# Patient Record
Sex: MALE | Employment: FULL TIME | URBAN - METROPOLITAN AREA
[De-identification: names, ages, dates, MRNs, and addresses within clinical notes are randomized per-mention and may not be internally consistent; named-entity substitution may affect disease eponyms.]

---

## 2017-02-10 ENCOUNTER — INFUSION THERAPY VISIT (OUTPATIENT)
Dept: INFUSION THERAPY | Facility: CLINIC | Age: 37
End: 2017-02-10
Payer: COMMERCIAL

## 2017-02-10 DIAGNOSIS — G35 MULTIPLE SCLEROSIS (H): Primary | ICD-10-CM

## 2017-02-10 PROCEDURE — 99207 ZZC NO CHARGE LOS: CPT

## 2017-02-10 PROCEDURE — 96413 CHEMO IV INFUSION 1 HR: CPT | Performed by: INTERNAL MEDICINE

## 2017-02-10 RX ADMIN — Medication 250 ML: at 08:55

## 2017-02-10 NOTE — PROGRESS NOTES
Infusion Nursing Note:  Freddie Garcia presents today for tysabri.    Patient seen by provider today: No   present during visit today: Not Applicable.    Note: N/A.    Intravenous Access:  Peripheral IV placed.    Treatment Conditions:  Tysabri pre-infusion checklist completed via touch program.      Post Infusion Assessment:  Patient tolerated infusion without incident.  No evidence of extravasations.  Access discontinued per protocol.    Discharge Plan:    Patient will return 3/24/17 for next appointment.   Patient discharged in stable condition accompanied by: self.  Departure Mode: Ambulatory.    Patricia Villagomez RN

## 2017-03-24 ENCOUNTER — INFUSION THERAPY VISIT (OUTPATIENT)
Dept: INFUSION THERAPY | Facility: CLINIC | Age: 37
End: 2017-03-24
Payer: COMMERCIAL

## 2017-03-24 VITALS
OXYGEN SATURATION: 97 % | SYSTOLIC BLOOD PRESSURE: 133 MMHG | DIASTOLIC BLOOD PRESSURE: 75 MMHG | BODY MASS INDEX: 26.96 KG/M2 | HEART RATE: 69 BPM | TEMPERATURE: 97.8 F | RESPIRATION RATE: 16 BRPM | WEIGHT: 198.8 LBS

## 2017-03-24 DIAGNOSIS — G35 MULTIPLE SCLEROSIS (H): Primary | ICD-10-CM

## 2017-03-24 PROCEDURE — 96413 CHEMO IV INFUSION 1 HR: CPT | Performed by: INTERNAL MEDICINE

## 2017-03-24 PROCEDURE — 99207 ZZC NO CHARGE LOS: CPT

## 2017-03-24 RX ORDER — DIPHENHYDRAMINE HYDROCHLORIDE 50 MG/ML
25 INJECTION INTRAMUSCULAR; INTRAVENOUS
Status: CANCELLED
Start: 2017-03-24

## 2017-03-24 RX ADMIN — Medication 250 ML: at 08:53

## 2017-03-24 ASSESSMENT — PAIN SCALES - GENERAL: PAINLEVEL: NO PAIN (0)

## 2017-03-24 NOTE — MR AVS SNAPSHOT
After Visit Summary   3/24/2017    Freddie Garcia    MRN: [id number]           Patient Information     Date Of Birth          1980        Visit Information        Provider Department      3/24/2017 8:30 AM BAY 2 INFUSION [facility]        Today's Diagnoses     Multiple sclerosis (H)    -  1       Follow-ups after your visit        Your next 10 appointments already scheduled     May 05, 2017  8:00 AM CDT   Level 2 with BAY 10 INFUSION   [facility]    [address] [place] [address] [phone]              Who to contact     If you have questions or need follow up information about today's clinic visit or your schedule please contact [facility] directly at [phone].  Normal or non-critical lab and imaging results will be communicated to you by MyChart, letter or phone within 4 business days after the clinic has received the results. If you do not hear from us within 7 days, please contact the clinic through MyChart or phone. If you have a critical or abnormal lab result, we will notify you by phone as soon as possible.  Submit refill requests through [employer] or call your pharmacy and they will forward the refill request to us. Please allow 3 business days for your refill to be completed.          Additional Information About Your Visit        Feverhart Information     [employer] is an electronic gateway that provides easy, online access to your medical records. With [employer], you can request a clinic appointment, read your test results, renew a prescription or communicate with your care team.     To sign up for [employer] visit the website at [email or web address]   You will be asked to enter the access code listed below, as well as some personal information. Please follow the directions to create your username and password.     Your access code is: [email or web address]  Expires: 6/22/2017 12:05 PM      Your access code will  in 90 days. If you need help or a new code, please contact your [facility] or call [phone] for assistance.        Care EveryWhere ID     This is your Care EveryWhere ID. This could be used by other organizations to access your [place] medical records  [id number]        Your Vitals Were     Pulse Temperature Respirations Pulse Oximetry BMI (Body Mass Index)       69 97.8  F (36.6  C) (Oral) 16 97% 26.96 kg/m2        Blood Pressure from Last 3 Encounters:   17 133/75   16 125/76   16 138/78    Weight from Last 3 Encounters:   17 90.2 kg (198 lb 12.8 oz)   16 88.5 kg (195 lb 3.2 oz)   16 89.9 kg (198 lb 4.8 oz)              We Performed the Following     Treatment Conditions     Treatment Conditions        Primary Care Provider    [facility]       No address on file        Thank you!     Thank you for choosing [facility]  for your care. Our goal is always to provide you with excellent care. Hearing back from our patients is one way we can continue to improve our services. Please take a few minutes to complete the written survey that you may receive in the mail after your visit with us. Thank you!             Your Updated Medication List - Protect others around you: Learn how to safely use, store and throw away your medicines at www.disposemymeds.org.          This list is accurate as of: 3/24/17 12:05 PM.  Always use your most recent med list.                   Brand Name Dispense Instructions for use    ibuprofen 200 MG tablet    ADVIL/MOTRIN     Take 200 mg by mouth every 4 hours as needed.       natalizumab 300 MG/15ML injection    TYSABRI    15 mL    Inject 15 mLs into the vein every 30 days.       vitamin D 1000 UNITS capsule      Take 1 capsule by mouth daily.

## 2017-03-24 NOTE — PROGRESS NOTES
Infusion Nursing Note:  Freddie Garcia presents today for Tysabri.    Patient seen by provider today: No   present during visit today: Not Applicable.    Note: N/A.    Intravenous Access:  Peripheral IV placed.    Treatment Conditions:  Tysabri pre-infusion checklist completed via touch program.      Post Infusion Assessment:  Patient tolerated infusion without incident.  Blood return noted pre and post infusion.  Site patent and intact, free from redness, edema or discomfort.  Access discontinued per protocol.    Discharge Plan:   Patient will return 5/5/17 for next appointment.   Patient discharged in stable condition accompanied by: self.  Departure Mode: Ambulatory.    Trisha Jones RN

## 2017-05-05 ENCOUNTER — INFUSION THERAPY VISIT (OUTPATIENT)
Dept: INFUSION THERAPY | Facility: CLINIC | Age: 37
End: 2017-05-05
Payer: COMMERCIAL

## 2017-05-05 VITALS
DIASTOLIC BLOOD PRESSURE: 77 MMHG | BODY MASS INDEX: 26.81 KG/M2 | HEART RATE: 72 BPM | RESPIRATION RATE: 16 BRPM | SYSTOLIC BLOOD PRESSURE: 126 MMHG | TEMPERATURE: 97.4 F | WEIGHT: 197.7 LBS | OXYGEN SATURATION: 98 %

## 2017-05-05 DIAGNOSIS — G35 MULTIPLE SCLEROSIS (H): Primary | ICD-10-CM

## 2017-05-05 PROCEDURE — 99207 ZZC NO CHARGE LOS: CPT

## 2017-05-05 PROCEDURE — 96413 CHEMO IV INFUSION 1 HR: CPT | Performed by: INTERNAL MEDICINE

## 2017-05-05 RX ORDER — DIPHENHYDRAMINE HYDROCHLORIDE 50 MG/ML
25 INJECTION INTRAMUSCULAR; INTRAVENOUS
Status: CANCELLED
Start: 2017-05-05

## 2017-05-05 RX ADMIN — Medication 250 ML: at 08:23

## 2017-05-05 NOTE — MR AVS SNAPSHOT
After Visit Summary   5/5/2017    Freddie Garcia    MRN: [id number]           Patient Information     Date Of Birth          1980        Visit Information        Provider Department      5/5/2017 8:00 AM BAY 10 INFUSION [facility]        Today's Diagnoses     Multiple sclerosis (H)    -  1       Follow-ups after your visit        Your next 10 appointments already scheduled     Jun 16, 2017  8:00 AM CDT   Level 2 with BAY 1 INFUSION   [facility] ([facility])    [address] [place] [address] [phone]              Who to contact     If you have questions or need follow up information about today's clinic visit or your schedule please contact [facility] directly at [phone].  Normal or non-critical lab and imaging results will be communicated to you by MyChart, letter or phone within 4 business days after the clinic has received the results. If you do not hear from us within 7 days, please contact the clinic through MyChart or phone. If you have a critical or abnormal lab result, we will notify you by phone as soon as possible.  Submit refill requests through [employer] or call your pharmacy and they will forward the refill request to us. Please allow 3 business days for your refill to be completed.          Additional Information About Your Visit        [employer] Information     [employer] is an electronic gateway that provides easy, online access to your medical records. With [employer], you can request a clinic appointment, read your test results, renew a prescription or communicate with your care team.     To sign up for [employer] visit the website at [email or web address]   You will be asked to enter the access code listed below, as well as some personal information. Please follow the directions to create your username and password.     Your access code is: [email or web address]  Expires: 6/22/2017 12:05 PM     Your  access code will  in 90 days. If you need help or a new code, please contact your [facility] or call [phone] for assistance.        Care EveryWhere ID     This is your Care EveryWhere ID. This could be used by other organizations to access your [place] medical records  [id number]        Your Vitals Were     Pulse Temperature Respirations Pulse Oximetry BMI (Body Mass Index)       72 97.4  F (36.3  C) (Oral) 16 98% 26.81 kg/m2        Blood Pressure from Last 3 Encounters:   17 126/77   17 133/75   16 125/76    Weight from Last 3 Encounters:   17 89.7 kg (197 lb 11.2 oz)   17 90.2 kg (198 lb 12.8 oz)   16 88.5 kg (195 lb 3.2 oz)              We Performed the Following     Treatment Conditions        Primary Care Provider    [facility]       No address on file        Thank you!     Thank you for choosing [facility]  for your care. Our goal is always to provide you with excellent care. Hearing back from our patients is one way we can continue to improve our services. Please take a few minutes to complete the written survey that you may receive in the mail after your visit with us. Thank you!             Your Updated Medication List - Protect others around you: Learn how to safely use, store and throw away your medicines at www.disposemymeds.org.          This list is accurate as of: 17  9:53 AM.  Always use your most recent med list.                   Brand Name Dispense Instructions for use    ibuprofen 200 MG tablet    ADVIL/MOTRIN     Take 200 mg by mouth every 4 hours as needed.       natalizumab 300 MG/15ML injection    TYSABRI    15 mL    Inject 15 mLs into the vein every 30 days.       vitamin D 1000 UNITS capsule      Take 1 capsule by mouth daily.

## 2017-05-05 NOTE — PROGRESS NOTES
Infusion Nursing Note:  Freddie Garcia presents today for Tysabri.    Patient seen by provider today: No   present during visit today: Not Applicable.    Note: N/A.    Intravenous Access:  Peripheral IV placed.    Treatment Conditions:  Tysabri pre-infusion checklist completed via touch program.      Post Infusion Assessment:  Patient tolerated infusion without incident.  Site patent and intact, free from redness, edema or discomfort.  No evidence of extravasations.  Access discontinued per protocol.    Discharge Plan:   Patient and/or family verbalized understanding of discharge instructions and all questions answered.  Copy of AVS reviewed with patient and/or family.  Patient will return 6/16/2017 for next appointment.  Patient discharged in stable condition accompanied by: self.  Departure Mode: Ambulatory.    Patricia Odonnell RN

## 2017-06-14 DIAGNOSIS — G35 MULTIPLE SCLEROSIS (H): Primary | ICD-10-CM

## 2017-06-14 RX ORDER — DIPHENHYDRAMINE HYDROCHLORIDE 50 MG/ML
25 INJECTION INTRAMUSCULAR; INTRAVENOUS
Status: CANCELLED
Start: 2017-06-19

## 2017-06-16 ENCOUNTER — INFUSION THERAPY VISIT (OUTPATIENT)
Dept: INFUSION THERAPY | Facility: CLINIC | Age: 37
End: 2017-06-16
Payer: COMMERCIAL

## 2017-06-16 VITALS
DIASTOLIC BLOOD PRESSURE: 84 MMHG | BODY MASS INDEX: 27.17 KG/M2 | HEART RATE: 70 BPM | RESPIRATION RATE: 18 BRPM | WEIGHT: 200.3 LBS | OXYGEN SATURATION: 98 % | TEMPERATURE: 97.2 F | SYSTOLIC BLOOD PRESSURE: 123 MMHG

## 2017-06-16 DIAGNOSIS — G35 MULTIPLE SCLEROSIS (H): Primary | ICD-10-CM

## 2017-06-16 PROCEDURE — 96413 CHEMO IV INFUSION 1 HR: CPT | Performed by: INTERNAL MEDICINE

## 2017-06-16 PROCEDURE — 99207 ZZC NO CHARGE LOS: CPT

## 2017-06-16 RX ORDER — DIPHENHYDRAMINE HYDROCHLORIDE 50 MG/ML
25 INJECTION INTRAMUSCULAR; INTRAVENOUS
Status: CANCELLED
Start: 2017-06-19

## 2017-06-16 RX ADMIN — Medication 250 ML: at 08:25

## 2017-06-16 NOTE — MR AVS SNAPSHOT
After Visit Summary   6/16/2017    Freddie Garcia    MRN: [id number]           Patient Information     Date Of Birth          1980        Visit Information        Provider Department      6/16/2017 8:00 AM [place] [facility]        Today's Diagnoses     Multiple sclerosis (H)    -  1       Follow-ups after your visit        Follow-up notes from your care team     Return in about 28 days (around 7/14/2017).      Your next 10 appointments already scheduled     Jul 28, 2017  8:30 AM CDT   Level 2 with [place] 4 [facility] ([facility])    [id number] [place] [address] [phone]              Who to contact     If you have questions or need follow up information about today's clinic visit or your schedule please contact [facility] directly at [phone].  Normal or non-critical lab and imaging results will be communicated to you by MyChart, letter or phone within 4 business days after the clinic has received the results. If you do not hear from us within 7 days, please contact the clinic through [employer] People[employer] or phone. If you have a critical or abnormal lab result, we will notify you by phone as soon as possible.  Submit refill requests through [employer] or call your pharmacy and they will forward the refill request to us. Please allow 3 business days for your refill to be completed.          Additional Information About Your Visit        MyChart Information     [employer] is an electronic gateway that provides easy, online access to your medical records. With [employer], you can request a clinic appointment, read your test results, renew a prescription or communicate with your care team.     To sign up for [employer] visit the website at [email or web address]   You will be asked to enter the access code listed below, as well as some personal information. Please follow the directions to create your username  and password.     Your access code is: [email or web address]  Expires: 2017 12:05 PM     Your access code will  in 90 days. If you need help or a new code, please contact your [facility] or call [phone] for assistance.        Care EveryWhere ID     This is your Care EveryWhere ID. This could be used by other organizations to access your [place] medical records  [id number]        Your Vitals Were     Pulse Temperature Respirations Pulse Oximetry BMI (Body Mass Index)       70 97.2  F (36.2  C) (Oral) 18 98% 27.17 kg/m2        Blood Pressure from Last 3 Encounters:   17 123/84   17 126/77   17 133/75    Weight from Last 3 Encounters:   17 90.9 kg (200 lb 4.8 oz)   17 89.7 kg (197 lb 11.2 oz)   17 90.2 kg (198 lb 12.8 oz)              Today, you had the following     No orders found for display       Primary Care Provider    [facility]       No address on file        Thank you!     Thank you for choosing [facility]  for your care. Our goal is always to provide you with excellent care. Hearing back from our patients is one way we can continue to improve our services. Please take a few minutes to complete the written survey that you may receive in the mail after your visit with us. Thank you!             Your Updated Medication List - Protect others around you: Learn how to safely use, store and throw away your medicines at www.disposemymeds.org.          This list is accurate as of: 17  9:54 AM.  Always use your most recent med list.                   Brand Name Dispense Instructions for use    ibuprofen 200 MG tablet    ADVIL/MOTRIN     Take 200 mg by mouth every 4 hours as needed.       natalizumab 300 MG/15ML injection    TYSABRI    15 mL    Inject 15 mLs into the vein every 30 days.       vitamin D 1000 UNITS capsule      Take 1 capsule by mouth daily.

## 2017-06-16 NOTE — PROGRESS NOTES
Infusion Nursing Note:  Freddie Garcia presents today for Tysabri.    Patient seen by provider today: No   present during visit today: Not Applicable.    Note: N/A.    Intravenous Access:  Peripheral IV placed.    Treatment Conditions:  Tysabri pre-infusion checklist completed via touch program.      Post Infusion Assessment:  Patient tolerated infusion without incident.  Patient declined observation.   No evidence of extravasations.  Access discontinued per protocol.    Discharge Plan:   Patient will return 7/28/17 for next appointment.   Patient discharged in stable condition accompanied by: self.  Departure Mode: Ambulatory.    Patricia Villagomez RN

## 2017-07-28 ENCOUNTER — INFUSION THERAPY VISIT (OUTPATIENT)
Dept: INFUSION THERAPY | Facility: CLINIC | Age: 37
End: 2017-07-28
Payer: COMMERCIAL

## 2017-07-28 VITALS
WEIGHT: 198.1 LBS | DIASTOLIC BLOOD PRESSURE: 79 MMHG | HEART RATE: 65 BPM | OXYGEN SATURATION: 100 % | TEMPERATURE: 97.4 F | RESPIRATION RATE: 16 BRPM | BODY MASS INDEX: 26.87 KG/M2 | SYSTOLIC BLOOD PRESSURE: 117 MMHG

## 2017-07-28 DIAGNOSIS — G35 MULTIPLE SCLEROSIS (H): Primary | ICD-10-CM

## 2017-07-28 PROCEDURE — 96413 CHEMO IV INFUSION 1 HR: CPT | Performed by: INTERNAL MEDICINE

## 2017-07-28 PROCEDURE — 99207 ZZC NO CHARGE LOS: CPT

## 2017-07-28 RX ORDER — DIPHENHYDRAMINE HYDROCHLORIDE 50 MG/ML
25 INJECTION INTRAMUSCULAR; INTRAVENOUS
Status: CANCELLED
Start: 2017-07-28

## 2017-07-28 RX ADMIN — Medication 500 ML: at 08:51

## 2017-07-28 NOTE — MR AVS SNAPSHOT
After Visit Summary   7/28/2017    Freddie Garcia    MRN: [id number]           Patient Information     Date Of Birth          1980        Visit Information        Provider Department      7/28/2017 8:30 AM BAY 4 INFUSION [facility]        Today's Diagnoses     Multiple sclerosis (H)    -  1       Follow-ups after your visit        Your next 10 appointments already scheduled     Sep 08, 2017  8:00 AM CDT   Level 2 with BAY 1 INFUSION   [facility] ([facility])    [address] [place] [address] [phone]              Who to contact     If you have questions or need follow up information about today's clinic visit or your schedule please contact [facility] directly at [phone].  Normal or non-critical lab and imaging results will be communicated to you by MyChart, letter or phone within 4 business days after the clinic has received the results. If you do not hear from us within 7 days, please contact the clinic through MyChart or phone. If you have a critical or abnormal lab result, we will notify you by phone as soon as possible.  Submit refill requests through Ar[employer] or call your pharmacy and they will forward the refill request to us. Please allow 3 business days for your refill to be completed.          Additional Information About Your Visit        [employer] Information     Arara is an electronic gateway that provides easy, online access to your medical records. With Arara, you can request a clinic appointment, read your test results, renew a prescription or communicate with your care team.     To sign up for Arara visit the website at [email or web address]   You will be asked to enter the access code listed below, as well as some personal information. Please follow the directions to create your username and password.     Your access code is: [email or web address]  Expires: 10/26/2017 10:45 AM      Your access code will  in 90 days. If you need help or a new code, please contact your [facility] or call [phone] for assistance.        Care EveryWhere ID     This is your Care EveryWhere ID. This could be used by other organizations to access your [place] medical records  [id number]        Your Vitals Were     Pulse Temperature Respirations Pulse Oximetry BMI (Body Mass Index)       65 97.4  F (36.3  C) (Oral) 16 100% 26.87 kg/m2        Blood Pressure from Last 3 Encounters:   17 117/79   17 123/84   17 126/77    Weight from Last 3 Encounters:   17 89.9 kg (198 lb 1.6 oz)   17 90.9 kg (200 lb 4.8 oz)   17 89.7 kg (197 lb 11.2 oz)              Today, you had the following     No orders found for display       Primary Care Provider    [facility]       No address on file        Equal Access to Services     TANYA ALVES : Hadii aad ku hadasho Soomaali, waaxda luqadaha, qaybta kaalmada adeegyada, waxay barberin valeriano madrid . So [facility] [phone].    ATENCIÓN: Si habla español, tiene a bailon disposición servicios gratuitos de asistencia lingüística. Llame al [phone].    We comply with applicable federal civil rights laws and [place] laws. We do not discriminate on the basis of race, color, national origin, age, disability sex, sexual orientation or gender identity.            Thank you!     Thank you for choosing [facility]  for your care. Our goal is always to provide you with excellent care. Hearing back from our patients is one way we can continue to improve our services. Please take a few minutes to complete the written survey that you may receive in the mail after your visit with us. Thank you!             Your Updated Medication List - Protect others around you: Learn how to safely use, store and throw away your medicines at www.disposemymeds.org.          This list is  accurate as of: 7/28/17 10:45 AM.  Always use your most recent med list.                   Brand Name Dispense Instructions for use Diagnosis    ibuprofen 200 MG tablet    ADVIL/MOTRIN     Take 200 mg by mouth every 4 hours as needed.        natalizumab 300 MG/15ML injection    TYSABRI    15 mL    Inject 15 mLs into the vein every 30 days.    Multiple sclerosis (H)       vitamin D 1000 UNITS capsule      Take 1 capsule by mouth daily.

## 2017-07-28 NOTE — PROGRESS NOTES
Infusion Nursing Note:  Freddie Garcia presents today for Tysabri.    Patient seen by provider today: No   present during visit today: Not Applicable.    Note: N/A.    Intravenous Access:  Peripheral IV placed.    Treatment Conditions:  Tysabri pre-infusion checklist completed via touch program.      Post Infusion Assessment:  Patient tolerated infusion without incident.  Site patent and intact, free from redness, edema or discomfort.  Pt refused tysabri observation of 60 minutes.No evidence of extravasations.  Access discontinued per protocol.    Discharge Plan:   Patient and/or family verbalized understanding of discharge instructions and all questions answered.  Copy of AVS reviewed with patient and/or family.  Patient will return 9/8/2017 for next appointment.  Patient discharged in stable condition accompanied by: self.  Departure Mode: Ambulatory.    Patricia Odonnell RN

## 2017-09-08 ENCOUNTER — INFUSION THERAPY VISIT (OUTPATIENT)
Dept: INFUSION THERAPY | Facility: CLINIC | Age: 37
End: 2017-09-08
Payer: COMMERCIAL

## 2017-09-08 VITALS
RESPIRATION RATE: 16 BRPM | HEART RATE: 82 BPM | OXYGEN SATURATION: 96 % | DIASTOLIC BLOOD PRESSURE: 82 MMHG | BODY MASS INDEX: 26.72 KG/M2 | SYSTOLIC BLOOD PRESSURE: 128 MMHG | WEIGHT: 197 LBS | TEMPERATURE: 97.3 F

## 2017-09-08 DIAGNOSIS — G35 MULTIPLE SCLEROSIS (H): Primary | ICD-10-CM

## 2017-09-08 PROCEDURE — 96413 CHEMO IV INFUSION 1 HR: CPT | Performed by: NURSE PRACTITIONER

## 2017-09-08 PROCEDURE — 99207 ZZC NO CHARGE LOS: CPT

## 2017-09-08 RX ORDER — DIPHENHYDRAMINE HYDROCHLORIDE 50 MG/ML
25 INJECTION INTRAMUSCULAR; INTRAVENOUS
Status: CANCELLED
Start: 2017-09-08

## 2017-09-08 RX ADMIN — Medication 250 ML: at 08:42

## 2017-09-08 NOTE — PROGRESS NOTES
Infusion Nursing Note:  Freddie Garcia presents today for Tysabri.    Patient seen by provider today: No   present during visit today: Not Applicable.    Note: N/A.    Intravenous Access:  Peripheral IV placed.    Treatment Conditions:  Tysabri pre-infusion checklist completed via touch program.      Post Infusion Assessment:  Patient tolerated infusion without incident.  No evidence of extravasations.  Access discontinued per protocol.    Discharge Plan:   Discharge instructions reviewed with: Patient.  Patient and/or family verbalized understanding of discharge instructions and all questions answered.  Patient discharged in stable condition accompanied by: self.  Departure Mode: Ambulatory.    Patricia Odonnell RN

## 2017-10-20 ENCOUNTER — INFUSION THERAPY VISIT (OUTPATIENT)
Dept: INFUSION THERAPY | Facility: CLINIC | Age: 37
End: 2017-10-20
Payer: COMMERCIAL

## 2017-10-20 VITALS
HEART RATE: 70 BPM | RESPIRATION RATE: 16 BRPM | DIASTOLIC BLOOD PRESSURE: 85 MMHG | SYSTOLIC BLOOD PRESSURE: 133 MMHG | OXYGEN SATURATION: 97 % | TEMPERATURE: 97.4 F

## 2017-10-20 DIAGNOSIS — G35 MULTIPLE SCLEROSIS (H): Primary | ICD-10-CM

## 2017-10-20 PROCEDURE — 96413 CHEMO IV INFUSION 1 HR: CPT | Performed by: NURSE PRACTITIONER

## 2017-10-20 PROCEDURE — 99207 ZZC NO CHARGE NURSE ONLY: CPT

## 2017-10-20 RX ORDER — DIPHENHYDRAMINE HYDROCHLORIDE 50 MG/ML
25 INJECTION INTRAMUSCULAR; INTRAVENOUS
Status: CANCELLED
Start: 2017-10-20

## 2017-10-20 RX ADMIN — Medication 250 ML: at 08:14

## 2017-10-20 ASSESSMENT — PAIN SCALES - GENERAL: PAINLEVEL: NO PAIN (0)

## 2017-10-20 NOTE — PROGRESS NOTES
Infusion Nursing Note:  Freddie Garcia presents today for tysabri.    Patient seen by provider today: No   present during visit today: Not Applicable.    Intravenous Access:  Peripheral IV placed.    Treatment Conditions:  ~~~ NOTE: If the patient answers yes to any of the questions below, hold the infusion and contact ordering provider or on-call provider.    1. Have you recently had an elevated temperature, fever, chills, productive cough, coughing for 3 weeks or longer or hemoptysis,  abnormal vital signs, night sweats,  chest pain or have you noticed a decrease in your appetite, unexplained weight loss or fatigue? No  2. Do you have any open wounds or new incisions? No  3. Do you have any recent or upcoming hospitalizations, surgeries or dental procedures? No  4. Do you currently have or recently have had any signs of illness or infection or are you on any antibiotics? No  5. Have you had any new, sudden or worsening abdominal pain? No  6. Have you or anyone in your household received a live vaccination in the past 4 weeks? Please note:  No live vaccines while on biologic/chemotherapy until 6 months after the last treatment.  Patient can receive the flu vaccine (shot only) and the pneumovax.  It is optimal for the patient to get these vaccines mid cycle, but they can be given at any time as long as it is not on the day of the infusion. No  7. Have you recently been diagnosed with any new nervous system diseases (ie. Multiple sclerosis, Guillain [place], seizures, neurological changes) or cancer diagnosis? Are you on any form of radiation or chemotherapy? No  8. Are you pregnant or breast feeding or do you have plans of pregnancy in the future? No  9. Have you been having any signs of worsening depression or suicidal ideations?  (benlysta only) No  10. Have there been any other new onset medical symptoms? Yes, pt broke his lower left arm 6 weeks ago; no surgery needed  .  Post Infusion  Assessment:  Patient tolerated infusion without incident.  Blood return noted pre and post infusion.  Site patent and intact, free from redness, edema or discomfort.  No evidence of extravasations.  Access discontinued per protocol.    Discharge Plan:   Copy of AVS reviewed with patient and/or family.  Patient will return 12/1/17 for next appointment.  Patient discharged in stable condition accompanied by: self.  Departure Mode: Ambulatory.    Lindsey Mills RN

## 2017-10-20 NOTE — MR AVS SNAPSHOT
After Visit Summary   10/20/2017    Freddie Garcia    MRN: [id number]           Patient Information     Date Of Birth          1980        Visit Information        Provider Department      10/20/2017 8:00 AM BAY 10 INFUSION [facility]        Today's Diagnoses     Multiple sclerosis (H)    -  1       Follow-ups after your visit        Your next 10 appointments already scheduled     Dec 01, 2017  8:30 AM CST   Level 2 with BAY 3 INFUSION   [facility] ([facility]    [address] [place] [address] [phone]              Who to contact     If you have questions or need follow up information about today's clinic visit or your schedule please contact [facility] directly at [phone].  Normal or non-critical lab and imaging results will be communicated to you by MyChart, letter or phone within 4 business days after the clinic has received the results. If you do not hear from us within 7 days, please contact the clinic through MyChart or phone. If you have a critical or abnormal lab result, we will notify you by phone as soon as possible.  Submit refill requests through Swiftype or call your pharmacy and they will forward the refill request to us. Please allow 3 business days for your refill to be completed.          Additional Information About Your Visit        Pensqrhart Information     Swiftype is an electronic gateway that provides easy, online access to your medical records. With Swiftype, you can request a clinic appointment, read your test results, renew a prescription or communicate with your care team.     To sign up for Swiftype visit the website at [email or web address]   You will be asked to enter the access code listed below, as well as some personal information. Please follow the directions to create your username and password.     Your access code is: [email or web address]  Expires: 10/26/2017 10:45 AM      Your access code will  in 90 days. If you need help or a new code, please contact your [facility] or call [phone] for assistance.        Care EveryWhere ID     This is your Care EveryWhere ID. This could be used by other organizations to access your [place] medical records  [id number]        Your Vitals Were     Pulse Temperature Respirations Pulse Oximetry          70 97.4  F (36.3  C) (Oral) 16 97%         Blood Pressure from Last 3 Encounters:   10/20/17 133/85   17 128/82   17 117/79    Weight from Last 3 Encounters:   17 89.4 kg (197 lb)   17 89.9 kg (198 lb 1.6 oz)   17 90.9 kg (200 lb 4.8 oz)              Today, you had the following     No orders found for display       Primary Care Provider Office Phone # Fax #    [facility] [phone]       [address]  [facility] [address]        Equal Access to Services     TANYA ALVES : Hadii aad ku hadasho Soomaali, waaxda luqadaha, qaybta kaalmada adeegyada, waxay idiin haymorenon jerod [place] . So [facility] [phone].    ATENCIÓN: Si habla español, tiene a bailon disposición servicios gratuitos de asistencia lingüística. Llame al [phone].    We comply with applicable federal civil rights laws and [place] laws. We do not discriminate on the basis of race, color, national origin, age, disability, sex, sexual orientation, or gender identity.            Thank you!     Thank you for choosing [facility]  for your care. Our goal is always to provide you with excellent care. Hearing back from our patients is one way we can continue to improve our services. Please take a few minutes to complete the written survey that you may receive in the mail after your visit with us. Thank you!             Your Updated Medication List - Protect others around you: Learn how to safely use, store and throw away your medicines at  www.disposemymeds.org.          This list is accurate as of: 10/20/17  9:35 AM.  Always use your most recent med list.                   Brand Name Dispense Instructions for use Diagnosis    ibuprofen 200 MG tablet    ADVIL/MOTRIN     Take 200 mg by mouth every 4 hours as needed.        natalizumab 300 MG/15ML injection    TYSABRI    15 mL    Inject 15 mLs into the vein every 30 days.    Multiple sclerosis (H)       vitamin D 1000 UNITS capsule      Take 1 capsule by mouth daily.

## 2017-12-01 ENCOUNTER — INFUSION THERAPY VISIT (OUTPATIENT)
Dept: INFUSION THERAPY | Facility: CLINIC | Age: 37
End: 2017-12-01
Payer: COMMERCIAL

## 2017-12-01 VITALS
BODY MASS INDEX: 28.2 KG/M2 | DIASTOLIC BLOOD PRESSURE: 72 MMHG | SYSTOLIC BLOOD PRESSURE: 136 MMHG | WEIGHT: 207.9 LBS | HEART RATE: 82 BPM | TEMPERATURE: 97.6 F

## 2017-12-01 DIAGNOSIS — G35 MULTIPLE SCLEROSIS (H): Primary | ICD-10-CM

## 2017-12-01 PROCEDURE — 99207 ZZC NO CHARGE NURSE ONLY: CPT

## 2017-12-01 PROCEDURE — 96413 CHEMO IV INFUSION 1 HR: CPT | Performed by: INTERNAL MEDICINE

## 2017-12-01 RX ORDER — DIPHENHYDRAMINE HYDROCHLORIDE 50 MG/ML
25 INJECTION INTRAMUSCULAR; INTRAVENOUS
Status: CANCELLED
Start: 2017-12-01

## 2017-12-01 RX ADMIN — Medication 250 ML: at 08:47

## 2017-12-01 ASSESSMENT — PAIN SCALES - GENERAL: PAINLEVEL: NO PAIN (0)

## 2017-12-01 NOTE — PROGRESS NOTES
Infusion Nursing Note:  Freddie Garcia presents today for tysabri.    Patient seen by provider today: No   present during visit today: Not Applicable.    Note: N/A.    Intravenous Access:  Peripheral IV placed.    Treatment Conditions:  Tysabri pre-infusion checklist completed via touch program. Pt refuse to stay during observation time, aware of risk.      Post Infusion Assessment:  Patient tolerated infusion without incident.  Site patent and intact, free from redness, edema or discomfort.  No evidence of extravasations.  Access discontinued per protocol.    Discharge Plan:   Patient and/or family verbalized understanding of discharge instructions and all questions answered.    Francesca Quintero RN

## 2017-12-01 NOTE — MR AVS SNAPSHOT
After Visit Summary   12/1/2017    Freddie Garcia    MRN: [id number]           Patient Information     Date Of Birth          1980        Visit Information        Provider Department      12/1/2017 8:30 AM BAY 3 INFUSION [facility]        Today's Diagnoses     Multiple sclerosis (H)    -  1       Follow-ups after your visit        Your next 10 appointments already scheduled     Jan 12, 2018 10:30 AM CST   Level 2 with BAY 2 INFUSION   [facility] ([facility])    [address] [place] [address] [phone]              Who to contact     If you have questions or need follow up information about today's clinic visit or your schedule please contact [facility] directly at [phone].  Normal or non-critical lab and imaging results will be communicated to you by MyChart, letter or phone within 4 business days after the clinic has received the results. If you do not hear from us within 7 days, please contact the clinic through MyChart or phone. If you have a critical or abnormal lab result, we will notify you by phone as soon as possible.  Submit refill requests through [employer] or call your pharmacy and they will forward the refill request to us. Please allow 3 business days for your refill to be completed.          Additional Information About Your Visit        Startupshart Information     [employer] is an electronic gateway that provides easy, online access to your medical records. With [employer], you can request a clinic appointment, read your test results, renew a prescription or communicate with your care team.     To sign up for [employer] visit the website at [email or web address]   You will be asked to enter the access code listed below, as well as some personal information. Please follow the directions to create your username and password.     Your access code is: [email or web address]  Expires: 3/1/2018 10:26 AM     Your  access code will  in 90 days. If you need help or a new code, please contact your [facility] or call [phone] for assistance.        Care EveryWhere ID     This is your Care EveryWhere ID. This could be used by other organizations to access your [place] medical records  [id number]        Your Vitals Were     Pulse Temperature BMI (Body Mass Index)             82 97.6  F (36.4  C) (Oral) 28.2 kg/m2          Blood Pressure from Last 3 Encounters:   17 136/72   10/20/17 133/85   17 128/82    Weight from Last 3 Encounters:   17 94.3 kg (207 lb 14.4 oz)   17 89.4 kg (197 lb)   17 89.9 kg (198 lb 1.6 oz)              Today, you had the following     No orders found for display       Primary Care Provider Office Phone # Fax #    [facility] [phone]       [address]  [facility] [address]        Equal Access to Services     [facility]: Hadii aad ku hadasho Soomaali, waaxda luqadaha, qaybta kaalmada adeegyada, waxginger [place] . So [facility] [phone].    ATENCIÓN: Si habla español, tiene a bailon disposición servicios gratuitos de asistencia lingüística. Llame al [phone].    We comply with applicable federal civil rights laws and [place] laws. We do not discriminate on the basis of race, color, national origin, age, disability, sex, sexual orientation, or gender identity.            Thank you!     Thank you for choosing [facility]  for your care. Our goal is always to provide you with excellent care. Hearing back from our patients is one way we can continue to improve our services. Please take a few minutes to complete the written survey that you may receive in the mail after your visit with us. Thank you!             Your Updated Medication List - Protect others around you: Learn how to safely use, store and throw away your medicines at www.disposemymeds.org.           This list is accurate as of: 12/1/17 10:26 AM.  Always use your most recent med list.                   Brand Name Dispense Instructions for use Diagnosis    ibuprofen 200 MG tablet    ADVIL/MOTRIN     Take 200 mg by mouth every 4 hours as needed.        natalizumab 300 MG/15ML injection    TYSABRI    15 mL    Inject 15 mLs into the vein every 30 days.    Multiple sclerosis (H)       vitamin D 1000 UNITS capsule      Take 1 capsule by mouth daily.

## 2018-01-12 ENCOUNTER — INFUSION THERAPY VISIT (OUTPATIENT)
Dept: INFUSION THERAPY | Facility: CLINIC | Age: 38
End: 2018-01-12
Payer: COMMERCIAL

## 2018-01-12 VITALS
DIASTOLIC BLOOD PRESSURE: 83 MMHG | BODY MASS INDEX: 27.5 KG/M2 | WEIGHT: 202.8 LBS | HEART RATE: 80 BPM | RESPIRATION RATE: 16 BRPM | SYSTOLIC BLOOD PRESSURE: 123 MMHG | OXYGEN SATURATION: 98 % | TEMPERATURE: 97.9 F

## 2018-01-12 DIAGNOSIS — G35 MULTIPLE SCLEROSIS (H): Primary | ICD-10-CM

## 2018-01-12 PROCEDURE — 99207 ZZC NO CHARGE NURSE ONLY: CPT

## 2018-01-12 PROCEDURE — 96413 CHEMO IV INFUSION 1 HR: CPT | Performed by: INTERNAL MEDICINE

## 2018-01-12 RX ORDER — DIPHENHYDRAMINE HYDROCHLORIDE 50 MG/ML
25 INJECTION INTRAMUSCULAR; INTRAVENOUS
Status: CANCELLED
Start: 2018-01-12

## 2018-01-12 RX ADMIN — Medication 250 ML: at 10:51

## 2018-01-12 ASSESSMENT — PAIN SCALES - GENERAL: PAINLEVEL: NO PAIN (0)

## 2018-01-12 NOTE — MR AVS SNAPSHOT
After Visit Summary   1/12/2018    Freddie Garcia    MRN: [id number]           Patient Information     Date Of Birth          1980        Visit Information        Provider Department      1/12/2018 10:30 AM BAY 2 INFUSION [facility]        Today's Diagnoses     Multiple sclerosis (H)    -  1       Follow-ups after your visit        Your next 10 appointments already scheduled     Feb 23, 2018  8:00 AM CST   Level 2 with BAY 5 INFUSION   [facility] ([facility]    [address] [place] [address] [phone]              Who to contact     If you have questions or need follow up information about today's clinic visit or your schedule please contact [facility] directly at [phone].  Normal or non-critical lab and imaging results will be communicated to you by MyChart, letter or phone within 4 business days after the clinic has received the results. If you do not hear from us within 7 days, please contact the clinic through MyChart or phone. If you have a critical or abnormal lab result, we will notify you by phone as soon as possible.  Submit refill requests through Voddler or call your pharmacy and they will forward the refill request to us. Please allow 3 business days for your refill to be completed.          Additional Information About Your Visit        Escape the Cityhart Information     Voddler is an electronic gateway that provides easy, online access to your medical records. With Voddler, you can request a clinic appointment, read your test results, renew a prescription or communicate with your care team.     To sign up for Voddler visit the website at [email or web address]   You will be asked to enter the access code listed below, as well as some personal information. Please follow the directions to create your username and password.     Your access code is: [email or web address]  Expires: 3/1/2018 10:26 AM     Your  access code will  in 90 days. If you need help or a new code, please contact your [facility] or call [phone] for assistance.        Care EveryWhere ID     This is your Care EveryWhere ID. This could be used by other organizations to access your [place] medical records  [id number]        Your Vitals Were     Pulse Temperature Respirations Pulse Oximetry BMI (Body Mass Index)       80 97.9  F (36.6  C) (Oral) 16 98% 27.5 kg/m2        Blood Pressure from Last 3 Encounters:   18 123/83   17 136/72   10/20/17 133/85    Weight from Last 3 Encounters:   18 92 kg (202 lb 12.8 oz)   17 94.3 kg (207 lb 14.4 oz)   17 89.4 kg (197 lb)              Today, you had the following     No orders found for display       Primary Care Provider Office Phone # Fax #    [facility] [phone]       [address]  [facility] [address]        Equal Access to Services     SKY ALVES : Hadii aad ku hadasho Soomaali, waaxda luqadaha, qaybta kaalmada adeegyada, silviano [place] . So [facility] [phone].    ATENCIÓN: Si habla español, tiene a bailon disposición servicios gratuitos de asistencia lingüística. Jaki al [phone].    We comply with applicable federal civil rights laws and [place] laws. We do not discriminate on the basis of race, color, national origin, age, disability, sex, sexual orientation, or gender identity.            Thank you!     Thank you for choosing [facility]  for your care. Our goal is always to provide you with excellent care. Hearing back from our patients is one way we can continue to improve our services. Please take a few minutes to complete the written survey that you may receive in the mail after your visit with us. Thank you!             Your Updated Medication List - Protect others around you: Learn how to safely use, store and throw away your medicines  at www.disposemymeds.org.          This list is accurate as of: 1/12/18 12:19 PM.  Always use your most recent med list.                   Brand Name Dispense Instructions for use Diagnosis    ibuprofen 200 MG tablet    ADVIL/MOTRIN     Take 200 mg by mouth every 4 hours as needed.        natalizumab 300 MG/15ML injection    TYSABRI    15 mL    Inject 15 mLs into the vein every 30 days.    Multiple sclerosis (H)       vitamin D 1000 UNITS capsule      Take 1 capsule by mouth daily.

## 2018-01-12 NOTE — PROGRESS NOTES
Infusion Nursing Note:  Freddie Garcia presents today for Tysabri.    Patient seen by provider today: No   present during visit today: Not Applicable.    Note: N/A.    Intravenous Access:  Peripheral IV placed.    Treatment Conditions:  Tysabri pre-infusion checklist completed via touch program.      Post Infusion Assessment:  Patient tolerated infusion without incident.  Patient declined to stay for 1 hour post infusion observation.  Blood return noted pre and post infusion.  Site patent and intact, free from redness, edema or discomfort.  Access discontinued per protocol.    Discharge Plan:   Patient discharged in stable condition accompanied by: self.  Departure Mode: Ambulatory.    Trisha Jones RN

## 2018-02-23 ENCOUNTER — INFUSION THERAPY VISIT (OUTPATIENT)
Dept: INFUSION THERAPY | Facility: CLINIC | Age: 38
End: 2018-02-23
Payer: COMMERCIAL

## 2018-02-23 VITALS
WEIGHT: 203 LBS | SYSTOLIC BLOOD PRESSURE: 130 MMHG | TEMPERATURE: 97.9 F | OXYGEN SATURATION: 98 % | RESPIRATION RATE: 16 BRPM | HEART RATE: 73 BPM | DIASTOLIC BLOOD PRESSURE: 82 MMHG | BODY MASS INDEX: 27.53 KG/M2

## 2018-02-23 DIAGNOSIS — G35 MULTIPLE SCLEROSIS (H): Primary | ICD-10-CM

## 2018-02-23 PROCEDURE — 96413 CHEMO IV INFUSION 1 HR: CPT | Performed by: INTERNAL MEDICINE

## 2018-02-23 PROCEDURE — 99207 ZZC NO CHARGE LOS: CPT

## 2018-02-23 RX ORDER — DIPHENHYDRAMINE HYDROCHLORIDE 50 MG/ML
25 INJECTION INTRAMUSCULAR; INTRAVENOUS
Status: CANCELLED
Start: 2018-02-23

## 2018-02-23 RX ADMIN — Medication 250 ML: at 08:32

## 2018-02-23 NOTE — PROGRESS NOTES
Infusion Nursing Note:  Freddie Garcia presents today for Tysabri.    Patient seen by provider today: No   present during visit today: Not Applicable.    Note: N/A.    Intravenous Access:  Peripheral IV placed.    Treatment Conditions:  Tysabri pre-infusion checklist completed via touch program.      Post Infusion Assessment:  Patient tolerated infusion without incident.  Patient refused observation post Tysabri per protocol.  No evidence of extravasations.  Access discontinued per protocol.    Discharge Plan:   Discharge instructions reviewed with: Patient.  Patient and/or family verbalized understanding of discharge instructions and all questions answered.  Patient discharged in stable condition accompanied by: self.  Departure Mode: Ambulatory.    Patricia Odonnell RN

## 2018-02-23 NOTE — MR AVS SNAPSHOT
After Visit Summary   2/23/2018    Freddie Garcia    MRN: [id number]           Patient Information     Date Of Birth          1980        Visit Information        Provider Department      2/23/2018 8:00 AM BAY 5 INFUSION [facility]        Today's Diagnoses     Multiple sclerosis (H)    -  1       Follow-ups after your visit        Your next 10 appointments already scheduled     Apr 06, 2018  8:00 AM CDT   Level 2 with BAY 1 INFUSION   [facility] ([facility])    [address] [place] [address] [phone]              Who to contact     If you have questions or need follow up information about today's clinic visit or your schedule please contact [facility] directly at [phone].  Normal or non-critical lab and imaging results will be communicated to you by MyChart, letter or phone within 4 business days after the clinic has received the results. If you do not hear from us within 7 days, please contact the clinic through MyChart or phone. If you have a critical or abnormal lab result, we will notify you by phone as soon as possible.  Submit refill requests through CodeHS or call your pharmacy and they will forward the refill request to us. Please allow 3 business days for your refill to be completed.          Additional Information About Your Visit        Meriton Networkshart Information     CodeHS is an electronic gateway that provides easy, online access to your medical records. With CodeHS, you can request a clinic appointment, read your test results, renew a prescription or communicate with your care team.     To sign up for CodeHS visit the website at [email or web address]   You will be asked to enter the access code listed below, as well as some personal information. Please follow the directions to create your username and password.     Your access code is: [email or web address]  Expires: 3/1/2018 10:26 AM     Your  access code will  in 90 days. If you need help or a new code, please contact your [facility] or call [phone] for assistance.        Care EveryWhere ID     This is your Care EveryWhere ID. This could be used by other organizations to access your [place] medical records  [id number]        Your Vitals Were     Pulse Temperature Respirations Pulse Oximetry BMI (Body Mass Index)       73 97.9  F (36.6  C) (Oral) 16 98% 27.53 kg/m2        Blood Pressure from Last 3 Encounters:   18 130/82   18 123/83   17 136/72    Weight from Last 3 Encounters:   18 92.1 kg (203 lb)   18 92 kg (202 lb 12.8 oz)   17 94.3 kg (207 lb 14.4 oz)              Today, you had the following     No orders found for display       Primary Care Provider Office Phone # Fax #    [facility] [phone]       [address]  [facility] [address]        Equal Access to Services     TANYA ALVES : Hadii aad ku hadasho Soomaali, waaxda luqadaha, qaybta kaalmada adeegyada, silviano [place] . So [facility] [phone].    ATENCIÓN: Si habla español, tiene a bailon disposición servicios gratuitos de asistencia lingüística. Jaki al [phone].    We comply with applicable federal civil rights laws and [place] laws. We do not discriminate on the basis of race, color, national origin, age, disability, sex, sexual orientation, or gender identity.            Thank you!     Thank you for choosing [facility]  for your care. Our goal is always to provide you with excellent care. Hearing back from our patients is one way we can continue to improve our services. Please take a few minutes to complete the written survey that you may receive in the mail after your visit with us. Thank you!             Your Updated Medication List - Protect others around you: Learn how to safely use, store and throw away your  medicines at www.disposemymeds.org.          This list is accurate as of 2/23/18 11:22 AM.  Always use your most recent med list.                   Brand Name Dispense Instructions for use Diagnosis    ibuprofen 200 MG tablet    ADVIL/MOTRIN     Take 200 mg by mouth every 4 hours as needed.        natalizumab 300 MG/15ML injection    TYSABRI    15 mL    Inject 15 mLs into the vein every 30 days.    Multiple sclerosis (H)       vitamin D 1000 UNITS capsule      Take 1 capsule by mouth daily.

## 2018-04-06 ENCOUNTER — INFUSION THERAPY VISIT (OUTPATIENT)
Dept: INFUSION THERAPY | Facility: CLINIC | Age: 38
End: 2018-04-06
Payer: COMMERCIAL

## 2018-04-06 VITALS
BODY MASS INDEX: 27.4 KG/M2 | OXYGEN SATURATION: 98 % | RESPIRATION RATE: 16 BRPM | DIASTOLIC BLOOD PRESSURE: 79 MMHG | SYSTOLIC BLOOD PRESSURE: 143 MMHG | HEART RATE: 81 BPM | TEMPERATURE: 97.8 F | WEIGHT: 202 LBS

## 2018-04-06 DIAGNOSIS — G35 MULTIPLE SCLEROSIS (H): Primary | ICD-10-CM

## 2018-04-06 PROCEDURE — 96413 CHEMO IV INFUSION 1 HR: CPT | Performed by: NURSE PRACTITIONER

## 2018-04-06 PROCEDURE — 99207 ZZC NO CHARGE LOS: CPT

## 2018-04-06 RX ORDER — DIPHENHYDRAMINE HYDROCHLORIDE 50 MG/ML
25 INJECTION INTRAMUSCULAR; INTRAVENOUS
Status: CANCELLED
Start: 2018-04-06

## 2018-04-06 RX ADMIN — Medication 250 ML: at 08:19

## 2018-04-06 NOTE — MR AVS SNAPSHOT
After Visit Summary   4/6/2018    Freddie Garcia    MRN: [id number]           Patient Information     Date Of Birth          1980        Visit Information        Provider Department      4/6/2018 8:00 AM BAY 1 INFUSION [facility]        Today's Diagnoses     Multiple sclerosis (H)    -  1       Follow-ups after your visit        Your next 10 appointments already scheduled     May 18, 2018  8:00 AM CDT   Level 2 with BAY 1 INFUSION   [facility] ([facility]    [address] [place] [address] [phone]            Jun 29, 2018  8:00 AM CDT   Level 2 with BAY 5 INFUSION   [facility] ([facility])    [address] [place] [address] [phone]              Who to contact     If you have questions or need follow up information about today's clinic visit or your schedule please contact [facility] directly at [phone].  Normal or non-critical lab and imaging results will be communicated to you by ObserveIThart, letter or phone within 4 business days after the clinic has received the results. If you do not hear from us within 7 days, please contact the clinic through NicePeopleAtWorkt or phone. If you have a critical or abnormal lab result, we will notify you by phone as soon as possible.  Submit refill requests through [employer] or call your pharmacy and they will forward the refill request to us. Please allow 3 business days for your refill to be completed.          Additional Information About Your Visit        MyChart Information     [employer] is an electronic gateway that provides easy, online access to your medical records. With [employer], you can request a clinic appointment, read your test results, renew a prescription or communicate with your care team.     To sign up for [employer] visit the website at [email or web address]   You will be asked to enter the access code  listed below, as well as some personal information. Please follow the directions to create your username and password.     Your access code is: [email or web address]  Expires: 2018  9:39 AM     Your access code will  in 90 days. If you need help or a new code, please contact your [facility] or call [phone] for assistance.        Care EveryWhere ID     This is your Care EveryWhere ID. This could be used by other organizations to access your [place] medical records  [id number]        Your Vitals Were     Pulse Temperature Respirations Pulse Oximetry BMI (Body Mass Index)       81 97.8  F (36.6  C) (Oral) 16 98% 27.4 kg/m2        Blood Pressure from Last 3 Encounters:   18 143/79   18 130/82   18 123/83    Weight from Last 3 Encounters:   18 91.6 kg (202 lb)   18 92.1 kg (203 lb)   18 92 kg (202 lb 12.8 oz)              Today, you had the following     No orders found for display       Primary Care Provider Office Phone # Fax #    [facility] [phone]       [address]  [facility] [address]        Equal Access to Services     TANYA ALVES : Hadii aad ku hadasho Soomaali, waaxda luqadaha, qaybta kaalmada adeegyada, waxay idiin hayaan jerod [place] . So [facility] [phone].    ATENCIÓN: Si habla español, tiene a bailon disposición servicios gratuitos de asistencia lingüística. Llame al [phone].    We comply with applicable federal civil rights laws and [place] laws. We do not discriminate on the basis of race, color, national origin, age, disability, sex, sexual orientation, or gender identity.            Thank you!     Thank you for choosing [facility]  for your care. Our goal is always to provide you with excellent care. Hearing back from our patients is one way we can continue to improve our services. Please take a few minutes to complete the written survey that you may  receive in the mail after your visit with us. Thank you!             Your Updated Medication List - Protect others around you: Learn how to safely use, store and throw away your medicines at www.disposemymeds.org.          This list is accurate as of 4/6/18  9:39 AM.  Always use your most recent med list.                   Brand Name Dispense Instructions for use Diagnosis    ibuprofen 200 MG tablet    ADVIL/MOTRIN     Take 200 mg by mouth every 4 hours as needed.        natalizumab 300 MG/15ML injection    TYSABRI    15 mL    Inject 15 mLs into the vein every 30 days.    Multiple sclerosis (H)       vitamin D 1000 UNITS capsule      Take 1 capsule by mouth daily.

## 2018-04-06 NOTE — PROGRESS NOTES
Infusion Nursing Note:   Freddie Garcia presents today for Tysabri.    Patient seen by provider today: No   present during visit today: Not Applicable.    Note: N/A.    Intravenous Access:  Peripheral IV placed.    Treatment Conditions:  Tysabri pre-infusion checklist completed via touch program.      Post Infusion Assessment:  Patient tolerated infusion without incident.  Site patent and intact, free from redness, edema or discomfort.  Pt declined to stay 60 minutes post tysabri per protocol  No evidence of extravasations.  Access discontinued per protocol.    Discharge Plan:   Discharge instructions reviewed with: Patient.  Patient and/or family verbalized understanding of discharge instructions and all questions answered.  Patient discharged in stable condition accompanied by: self.  Departure Mode: Ambulatory.    Patricia Odonnell RN

## 2018-05-18 ENCOUNTER — INFUSION THERAPY VISIT (OUTPATIENT)
Dept: INFUSION THERAPY | Facility: CLINIC | Age: 38
End: 2018-05-18
Payer: COMMERCIAL

## 2018-05-18 VITALS
WEIGHT: 204 LBS | HEART RATE: 73 BPM | DIASTOLIC BLOOD PRESSURE: 75 MMHG | TEMPERATURE: 98 F | BODY MASS INDEX: 27.67 KG/M2 | OXYGEN SATURATION: 97 % | RESPIRATION RATE: 16 BRPM | SYSTOLIC BLOOD PRESSURE: 125 MMHG

## 2018-05-18 DIAGNOSIS — G35 MULTIPLE SCLEROSIS (H): Primary | ICD-10-CM

## 2018-05-18 PROCEDURE — 99207 ZZC NO CHARGE LOS: CPT

## 2018-05-18 PROCEDURE — 96413 CHEMO IV INFUSION 1 HR: CPT | Performed by: INTERNAL MEDICINE

## 2018-05-18 RX ORDER — DIPHENHYDRAMINE HYDROCHLORIDE 50 MG/ML
25 INJECTION INTRAMUSCULAR; INTRAVENOUS
Status: CANCELLED
Start: 2018-05-18

## 2018-05-18 RX ADMIN — Medication 250 ML: at 08:18

## 2018-05-18 NOTE — MR AVS SNAPSHOT
After Visit Summary   5/18/2018    Freddie Garcia    MRN: [id number]           Patient Information     Date Of Birth          1980        Visit Information        Provider Department      5/18/2018 8:00 AM BAY 1 INFUSION [email or web address]        Today's Diagnoses     Multiple sclerosis (H)    -  1       Follow-ups after your visit        Your next 10 appointments already scheduled     Jun 29, 2018  8:00 AM CDT   Level 2 with BAY 5 INFUSION   [facility] [[email or web address] ([facility] [[email or web address]    [address] [place] [address] [phone]              Who to contact     If you have questions or need follow up information about today's clinic visit or your schedule please contact [facility] directly at [phone].  Normal or non-critical lab and imaging results will be communicated to you by MyChart, letter or phone within 4 business days after the clinic has received the results. If you do not hear from us within 7 days, please contact the clinic through MyChart or phone. If you have a critical or abnormal lab result, we will notify you by phone as soon as possible.  Submit refill requests through [employer] or call your pharmacy and they will forward the refill request to us. Please allow 3 business days for your refill to be completed.          Additional Information About Your Visit        AppLayerhart Information     [employer] is an electronic gateway that provides easy, online access to your medical records. With [employer], you can request a clinic appointment, read your test results, renew a prescription or communicate with your care team.     To sign up for [employer] visit the website at [email or web address]   You will be asked to enter the access code listed below, as well as some personal information. Please follow the directions to create your username and password.     Your access code is: [email or web address]  Expires: 7/5/2018  9:39 AM     Your  access code will  in 90 days. If you need help or a new code, please contact your [facility] or call [phone] for assistance.        Care EveryWhere ID     This is your Care EveryWhere ID. This could be used by other organizations to access your [place] medical records  [id number]        Your Vitals Were     Pulse Temperature Respirations Pulse Oximetry BMI (Body Mass Index)       73 98  F (36.7  C) (Oral) 16 97% 27.67 kg/m2        Blood Pressure from Last 3 Encounters:   18 125/75   18 143/79   18 130/82    Weight from Last 3 Encounters:   18 92.5 kg (204 lb)   18 91.6 kg (202 lb)   18 92.1 kg (203 lb)              Today, you had the following     No orders found for display       Primary Care Provider Office Phone # Fax #    [facility] [phone]       [address]  [facility] [address]        Equal Access to Services     [facility] : Hadii aad ku hadasho Soomaali, waaxda luqadaha, qaybta kaalmada adeegyada, waxay idiin haylondon [place] . So [facility] [phone].    ATENCIÓN: Si habla español, tiene a bailon disposición servicios gratuitos de asistencia lingüística. Llame al [phone].    We comply with applicable federal civil rights laws and [place] laws. We do not discriminate on the basis of race, color, national origin, age, disability, sex, sexual orientation, or gender identity.            Thank you!     Thank you for choosing [facility]  for your care. Our goal is always to provide you with excellent care. Hearing back from our patients is one way we can continue to improve our services. Please take a few minutes to complete the written survey that you may receive in the mail after your visit with us. Thank you!             Your Updated Medication List - Protect others around you: Learn how to safely use, store and throw away your medicines at  www.disposemymeds.org.          This list is accurate as of 5/18/18  3:52 PM.  Always use your most recent med list.                   Brand Name Dispense Instructions for use Diagnosis    ibuprofen 200 MG tablet    ADVIL/MOTRIN     Take 200 mg by mouth every 4 hours as needed.        natalizumab 300 MG/15ML injection    TYSABRI    15 mL    Inject 15 mLs into the vein every 30 days.    Multiple sclerosis (H)       vitamin D 1000 units capsule      Take 1 capsule by mouth daily.

## 2018-05-18 NOTE — PROGRESS NOTES
Infusion Nursing Note:  Freddie Garcia presents today for Tysabri.    Patient seen by provider today: No   present during visit today: Not Applicable.    Note: N/A.    Intravenous Access:  Peripheral IV placed.    Treatment Conditions:  Tysabri pre-infusion checklist completed via touch program.      Post Infusion Assessment:  Patient tolerated infusion without incident.  Patient refused observation post Tysabri per protocol.  Site patent and intact, free from redness, edema or discomfort.  No evidence of extravasations.  Access discontinued per protocol.    Discharge Plan:   Discharge instructions reviewed with: Patient.  Patient and/or family verbalized understanding of discharge instructions and all questions answered.  Patient discharged in stable condition accompanied by: self.  Departure Mode: Ambulatory.    Patricia Odonnell RN

## 2018-06-28 ENCOUNTER — MEDICAL CORRESPONDENCE (OUTPATIENT)
Dept: HEALTH INFORMATION MANAGEMENT | Facility: CLINIC | Age: 38
End: 2018-06-28

## 2018-06-28 DIAGNOSIS — G35 MULTIPLE SCLEROSIS (H): Primary | ICD-10-CM

## 2018-06-29 ENCOUNTER — INFUSION THERAPY VISIT (OUTPATIENT)
Dept: INFUSION THERAPY | Facility: CLINIC | Age: 38
End: 2018-06-29
Payer: COMMERCIAL

## 2018-06-29 VITALS
WEIGHT: 201 LBS | DIASTOLIC BLOOD PRESSURE: 88 MMHG | OXYGEN SATURATION: 97 % | SYSTOLIC BLOOD PRESSURE: 126 MMHG | RESPIRATION RATE: 16 BRPM | HEART RATE: 76 BPM | BODY MASS INDEX: 27.26 KG/M2

## 2018-06-29 DIAGNOSIS — G35 MULTIPLE SCLEROSIS (H): Primary | ICD-10-CM

## 2018-06-29 PROCEDURE — 99207 ZZC NO CHARGE LOS: CPT

## 2018-06-29 PROCEDURE — 96413 CHEMO IV INFUSION 1 HR: CPT | Performed by: INTERNAL MEDICINE

## 2018-06-29 RX ADMIN — Medication 250 ML: at 08:37

## 2018-06-29 ASSESSMENT — PAIN SCALES - GENERAL: PAINLEVEL: NO PAIN (0)

## 2018-06-29 NOTE — MR AVS SNAPSHOT
After Visit Summary   6/29/2018    Freddie Garcia    MRN: [id number]           Patient Information     Date Of Birth          1980        Visit Information        Provider Department      6/29/2018 8:00 AM BAY 5 INFUSION [facility]        Today's Diagnoses     Multiple sclerosis (H)    -  1       Follow-ups after your visit        Your next 10 appointments already scheduled     Aug 10, 2018  8:00 AM CDT   Level 2 with [place] 5 INFUSION   [facility] ([facility])    [address] [facility] [address] [phone]            Sep 21, 2018  8:00 AM CDT   Level 2 with BAY 5 INFUSION   [facility] ([facility])    [address] [facility] [address] [phone]              Who to contact     If you have questions or need follow up information about today's clinic visit or your schedule please contact [facility] directly at [phone].  Normal or non-critical lab and imaging results will be communicated to you by MyChart, letter or phone within 4 business days after the clinic has received the results. If you do not hear from us within 7 days, please contact the clinic through [employer] or phone. If you have a critical or abnormal lab result, we will notify you by phone as soon as possible.  Submit refill requests through needmade or call your pharmacy and they will forward the refill request to us. Please allow 3 business days for your refill to be completed.          Additional Information About Your Visit        Care EveryWhere ID     This is your Care EveryWhere ID. This could be used by other organizations to access your [place] medical records  [id number]        Your Vitals Were     Pulse Respirations Pulse Oximetry BMI (Body Mass Index)          76 16 97% 27.26 kg/m2         Blood Pressure from Last 3 Encounters:   06/29/18 126/88   05/18/18 125/75   04/06/18 143/79     Weight from Last 3 Encounters:   06/29/18 91.2 kg (201 lb)   05/18/18 92.5 kg (204 lb)   04/06/18 91.6 kg (202 lb)              Today, you had the following     No orders found for display       Primary Care Provider Office Phone # Fax #    Talya [facility] [phone] [address]  [facility] [address]        Equal Access to Services     TANYA ALVES : Hadii aad ku hadasho Soomaali, waaxda luqadaha, qaybta kaalmada adeegyada, waxay idiin hayaan adeeg soranjanaadam lasuraj . So [facility] [phone].    ATENCIÓN: Si wadela manuela, tiene a bailon disposición servicios gratuitos de asistencia lingüística. Llame al [phone].    We comply with applicable federal civil rights laws and [place] laws. We do not discriminate on the basis of race, color, national origin, age, disability, sex, sexual orientation, or gender identity.            Thank you!     Thank you for choosing [facility]  for your care. Our goal is always to provide you with excellent care. Hearing back from our patients is one way we can continue to improve our services. Please take a few minutes to complete the written survey that you may receive in the mail after your visit with us. Thank you!             Your Updated Medication List - Protect others around you: Learn how to safely use, store and throw away your medicines at www.disposemymeds.org.          This list is accurate as of 6/29/18 12:36 PM.  Always use your most recent med list.                   Brand Name Dispense Instructions for use Diagnosis    ibuprofen 200 MG tablet    ADVIL/MOTRIN     Take 200 mg by mouth every 4 hours as needed.        natalizumab 300 MG/15ML injection    TYSABRI    15 mL    Inject 15 mLs into the vein every 30 days.    Multiple sclerosis (H)       vitamin D 1000 units capsule      Take 1 capsule by mouth daily.

## 2018-06-29 NOTE — PROGRESS NOTES
"Infusion Nursing Note:  Freddie Garcia presents today for Tyasbri.    Patient seen by provider today: No   present during visit today: Not Applicable.    Note: .    Intravenous Access:  Peripheral IV placed.    Treatment Conditions:  MS touch checklist completed via www.touchprogram.[employer]  Pt declined observation period of 60 mins.     Orders for labs indicate July 2018 due date.   Pt reports reports he does not need labs today and that he \"just had them drawn\" at [facility] neurology [facility]. Pt states \"they do my labs a lot\". Pt will be back and early August and RN advised he have his labs drawn prior to this visit as ordered at his preferred site as ordered.     Post Infusion Assessment:  Patient tolerated infusion without incident.  Blood return noted pre and post infusion.  No evidence of extravasations.  Access discontinued per protocol.    Discharge Plan:   Schedule reviewed with patient and/or family. Patient will return 8/10/18 for next appointment.  Patient discharged in stable condition accompanied by: self.  Departure Mode: Ambulatory.    Alison Karimi RN                        "

## 2018-08-10 ENCOUNTER — INFUSION THERAPY VISIT (OUTPATIENT)
Dept: INFUSION THERAPY | Facility: CLINIC | Age: 38
End: 2018-08-10
Payer: COMMERCIAL

## 2018-08-10 VITALS
RESPIRATION RATE: 18 BRPM | HEART RATE: 70 BPM | OXYGEN SATURATION: 98 % | SYSTOLIC BLOOD PRESSURE: 122 MMHG | TEMPERATURE: 97.3 F | WEIGHT: 204.5 LBS | DIASTOLIC BLOOD PRESSURE: 81 MMHG | BODY MASS INDEX: 27.74 KG/M2

## 2018-08-10 DIAGNOSIS — G35 MULTIPLE SCLEROSIS (H): Primary | ICD-10-CM

## 2018-08-10 PROCEDURE — 96413 CHEMO IV INFUSION 1 HR: CPT | Performed by: NURSE PRACTITIONER

## 2018-08-10 PROCEDURE — 99207 ZZC NO CHARGE NURSE ONLY: CPT

## 2018-08-10 RX ADMIN — Medication 250 ML: at 08:25

## 2018-08-10 ASSESSMENT — PAIN SCALES - GENERAL: PAINLEVEL: NO PAIN (0)

## 2018-08-10 NOTE — PROGRESS NOTES
Infusion Nursing Note:  Freddie Garcia presents today for Tysabri.   Patient seen by provider today: No   present during visit today: Not Applicable.    Note: Pt states he is doing well, denies any problems with prior Tysabri infusions, denies any reason to hold treatment.    Intravenous Access:  Peripheral IV placed.    Treatment Conditions:  Lab Results   Component Value Date    HGB 14.8 07/30/2015     Lab Results   Component Value Date    WBC 8.6 07/30/2015      Lab Results   Component Value Date    ANEU 4.3 07/30/2015     Lab Results   Component Value Date     07/30/2015      Results reviewed, labs MET treatment parameters, ok to proceed with treatment.  Tysabri pre-infusion checklist completed via touch program.      Post Infusion Assessment:  Patient tolerated infusion without incident.  Blood return noted pre and post infusion.  Site patent and intact, free from redness, edema or discomfort.  No evidence of extravasations.  Access discontinued per protocol.    Discharge Plan:   Return 09/21/18 for Tysabri infusion.  Discharge instructions reviewed with: Patient.  Patient and/or family verbalized understanding of discharge instructions and all questions answered.  Patient discharged in stable condition accompanied by: self and wife.  Departure Mode: Ambulatory.    Suzan Gonzalez RN

## 2018-08-10 NOTE — MR AVS SNAPSHOT
After Visit Summary   8/10/2018    Freddie Garcia    MRN: [id number]           Patient Information     Date Of Birth          1980        Visit Information        Provider Department      8/10/2018 8:00 AM [address]        Today's Diagnoses     Multiple sclerosis (H)    -  1       Follow-ups after your visit        Your next 10 appointments already scheduled     Sep 21, 2018  8:00 AM CDT   Level 2 with [address] ([facility])    [address] [place] [address] [phone]              Who to contact     If you have questions or need follow up information about today's clinic visit or your schedule please contact [facility] directly at [phone].  Normal or non-critical lab and imaging results will be communicated to you by MyChart, letter or phone within 4 business days after the clinic has received the results. If you do not hear from us within 7 days, please contact the clinic through MyChart or phone. If you have a critical or abnormal lab result, we will notify you by phone as soon as possible.  Submit refill requests through [employer] or call your pharmacy and they will forward the refill request to us. Please allow 3 business days for your refill to be completed.          Additional Information About Your Visit        Care EveryWhere ID     This is your Care EveryWhere ID. This could be used by other organizations to access your [place] medical records  [id number]        Your Vitals Were     Pulse Temperature Respirations Pulse Oximetry BMI (Body Mass Index)       70 97.3  F (36.3  C) (Oral) 18 98% 27.74 kg/m2        Blood Pressure from Last 3 Encounters:   08/10/18 122/81   06/29/18 126/88   05/18/18 125/75    Weight from Last 3 Encounters:   08/10/18 92.8 kg (204 lb 8 oz)   06/29/18 91.2 kg (201 lb)   05/18/18 92.5 kg (204 lb)              Today, you had the  following     No orders found for display       Primary Care Provider Office Phone # Fax #    Talya [facility] [phone] [address]  [facility] [address]        Equal Access to Services     TANYA ALVES : Hadlori yair ku warreno Sodevanteali, waaxda luqadaha, qaybta kaalmada adeegyada, silviano beltránn jerod valencia julius corey. So [facility] [phone].    ATENCIÓN: Si habla español, tiene a bailon disposición servicios gratuitos de asistencia lingüística. Llame al [phone].    We comply with applicable federal civil rights laws and [place] laws. We do not discriminate on the basis of race, color, national origin, age, disability, sex, sexual orientation, or gender identity.            Thank you!     Thank you for choosing [facility]  for your care. Our goal is always to provide you with excellent care. Hearing back from our patients is one way we can continue to improve our services. Please take a few minutes to complete the written survey that you may receive in the mail after your visit with us. Thank you!             Your Updated Medication List - Protect others around you: Learn how to safely use, store and throw away your medicines at www.disposemymeds.org.          This list is accurate as of 8/10/18  9:56 AM.  Always use your most recent med list.                   Brand Name Dispense Instructions for use Diagnosis    ibuprofen 200 MG tablet    ADVIL/MOTRIN     Take 200 mg by mouth every 4 hours as needed.        natalizumab 300 MG/15ML injection    TYSABRI    15 mL    Inject 15 mLs into the vein every 30 days.    Multiple sclerosis (H)       vitamin D 1000 units capsule      Take 1 capsule by mouth daily.

## 2018-09-21 ENCOUNTER — INFUSION THERAPY VISIT (OUTPATIENT)
Dept: INFUSION THERAPY | Facility: CLINIC | Age: 38
End: 2018-09-21
Payer: COMMERCIAL

## 2018-09-21 VITALS
TEMPERATURE: 97.4 F | RESPIRATION RATE: 16 BRPM | OXYGEN SATURATION: 98 % | SYSTOLIC BLOOD PRESSURE: 117 MMHG | HEART RATE: 69 BPM | DIASTOLIC BLOOD PRESSURE: 70 MMHG

## 2018-09-21 DIAGNOSIS — G35 MULTIPLE SCLEROSIS (H): Primary | ICD-10-CM

## 2018-09-21 PROCEDURE — 96413 CHEMO IV INFUSION 1 HR: CPT | Performed by: INTERNAL MEDICINE

## 2018-09-21 PROCEDURE — 99207 ZZC NO CHARGE NURSE ONLY: CPT

## 2018-09-21 RX ADMIN — Medication 250 ML: at 08:11

## 2018-09-21 ASSESSMENT — PAIN SCALES - GENERAL: PAINLEVEL: NO PAIN (0)

## 2018-09-21 NOTE — PROGRESS NOTES
Infusion Nursing Note:  Freddie Garcia presents today for tysabri.    Patient seen by provider today: No   present during visit today: Not Applicable.    Note: Labs noted on therapy plan order to be done in July 2018. Patient cannot remember is they were done or not. He will contact provider and do them on November visit or go earlier for a lob only appointment if provider prefers.    Intravenous Access:  Peripheral IV placed.    Treatment Conditions:  Tysabri pre-infusion checklist completed via touch program.      Post Infusion Assessment:  Patient tolerated infusion without incident.  Blood return noted pre and post infusion.  Site patent and intact, free from redness, edema or discomfort.  No evidence of extravasations.  Access discontinued per protocol.    Discharge Plan:   Copy of AVS reviewed with patient and/or family.  Patient will return 11/2 for next appointment.  Patient discharged in stable condition accompanied by: self.  Departure Mode: Ambulatory.    Lindsey Mills RN

## 2018-09-21 NOTE — MR AVS SNAPSHOT
After Visit Summary   9/21/2018    Freddie Garcia    MRN: [id number]           Patient Information     Date Of Birth          1980        Visit Information        Provider Department      9/21/2018 8:00 AM BAY 5 INFUSION [facility]        Today's Diagnoses     Multiple sclerosis (H)    -  1       Follow-ups after your visit        Your next 10 appointments already scheduled     Nov 02, 2018  8:30 AM CDT   Level 2 with [place] 3 INFUSION   [facility]    [id number] [place] [id number] [phone]              Who to contact     If you have questions or need follow up information about today's clinic visit or your schedule please contact [facility] directly at [phone].  Normal or non-critical lab and imaging results will be communicated to you by MyChart, letter or phone within 4 business days after the clinic has received the results. If you do not hear from us within 7 days, please contact the clinic through MyChart or phone. If you have a critical or abnormal lab result, we will notify you by phone as soon as possible.  Submit refill requests through [employer]ASS or call your pharmacy and they will forward the refill request to us. Please allow 3 business days for your refill to be completed.          Additional Information About Your Visit        Care EveryWhere ID     This is your Care EveryWhere ID. This could be used by other organizations to access your [place] medical records  [id number]        Your Vitals Were     Pulse Temperature Respirations Pulse Oximetry          69 97.4  F (36.3  C) (Oral) 16 98%         Blood Pressure from Last 3 Encounters:   09/21/18 117/70   08/10/18 122/81   06/29/18 126/88    Weight from Last 3 Encounters:   08/10/18 92.8 kg (204 lb 8 oz)   06/29/18 91.2 kg (201 lb)   05/18/18 92.5 kg (204 lb)              Today, you had the following     No orders found for  display       Primary Care Provider Office Phone # Fax #    Talya [facility] [phone] [address]  [facility] [address]        Equal Access to Services     TANYA ALVES : Hadii yair ku hadsergeyo Sodevanteali, waaxda luqadaha, qaybta kaalmada adedangelo, silviano valencia laGrisellondon corey. So [facility] [phone].    ATENCIÓN: Si habla español, tiene a bailon disposición servicios gratuitos de asistencia lingüística. Llame al [phone].    We comply with applicable federal civil rights laws and [place] laws. We do not discriminate on the basis of race, color, national origin, age, disability, sex, sexual orientation, or gender identity.            Thank you!     Thank you for choosing [facility]  for your care. Our goal is always to provide you with excellent care. Hearing back from our patients is one way we can continue to improve our services. Please take a few minutes to complete the written survey that you may receive in the mail after your visit with us. Thank you!             Your Updated Medication List - Protect others around you: Learn how to safely use, store and throw away your medicines at www.disposemymeds.org.          This list is accurate as of 9/21/18 11:01 AM.  Always use your most recent med list.                   Brand Name Dispense Instructions for use Diagnosis    ibuprofen 200 MG tablet    ADVIL/MOTRIN     Take 200 mg by mouth every 4 hours as needed.        natalizumab 300 MG/15ML injection    TYSABRI    15 mL    Inject 15 mLs into the vein every 30 days.    Multiple sclerosis (H)       vitamin D 1000 units capsule      Take 1 capsule by mouth daily.

## 2018-11-13 ENCOUNTER — INFUSION THERAPY VISIT (OUTPATIENT)
Dept: INFUSION THERAPY | Facility: CLINIC | Age: 38
End: 2018-11-13
Payer: COMMERCIAL

## 2018-11-13 VITALS
TEMPERATURE: 98 F | HEART RATE: 80 BPM | SYSTOLIC BLOOD PRESSURE: 145 MMHG | DIASTOLIC BLOOD PRESSURE: 78 MMHG | OXYGEN SATURATION: 99 % | BODY MASS INDEX: 27.8 KG/M2 | WEIGHT: 205 LBS

## 2018-11-13 DIAGNOSIS — G35 MULTIPLE SCLEROSIS (H): Primary | ICD-10-CM

## 2018-11-13 PROCEDURE — 99207 ZZC NO CHARGE LOS: CPT

## 2018-11-13 PROCEDURE — 96413 CHEMO IV INFUSION 1 HR: CPT | Performed by: INTERNAL MEDICINE

## 2018-11-13 RX ADMIN — Medication 250 ML: at 08:58

## 2018-11-13 ASSESSMENT — PAIN SCALES - GENERAL: PAINLEVEL: NO PAIN (0)

## 2018-11-13 NOTE — MR AVS SNAPSHOT
After Visit Summary   11/13/2018    Freddie Garcia    MRN: [id number]           Patient Information     Date Of Birth          1980        Visit Information        Provider Department      11/13/2018 8:30 AM BAY 1 INFUSION [facility]        Today's Diagnoses     Multiple sclerosis (H)    -  1       Follow-ups after your visit        Your next 10 appointments already scheduled     Dec 21, 2018  8:00 AM CST   Level 2 with BAY 5 INFUSION   [facility] ([facility]    [address] [place] [id number] [phone]              Who to contact     If you have questions or need follow up information about today's clinic visit or your schedule please contact [facility] directly at [phone].  Normal or non-critical lab and imaging results will be communicated to you by MyChart, letter or phone within 4 business days after the clinic has received the results. If you do not hear from us within 7 days, please contact the clinic through MyChart or phone. If you have a critical or abnormal lab result, we will notify you by phone as soon as possible.  Submit refill requests through [employer] or call your pharmacy and they will forward the refill request to us. Please allow 3 business days for your refill to be completed.          Additional Information About Your Visit        Care EveryWhere ID     This is your Care EveryWhere ID. This could be used by other organizations to access your [place] medical records  [id number]        Your Vitals Were     Pulse Temperature Pulse Oximetry BMI (Body Mass Index)          80 98  F (36.7  C) (Oral) 99% 27.8 kg/m2         Blood Pressure from Last 3 Encounters:   11/13/18 145/78   09/21/18 117/70   08/10/18 122/81    Weight from Last 3 Encounters:   11/13/18 93 kg (205 lb)   08/10/18 92.8 kg (204 lb 8 oz)   06/29/18 91.2 kg (201 lb)              Today, you had the following     No orders  found for display       Primary Care Provider Office Phone # Fax #    Talya [facility] [phone] [address]  [facility] [address]        Equal Access to Services     TANYA ALVES : Elif mazariegos ku warreno Sodevanteali, waaxda luqadaha, qaybta kaalmada adeegyada, silviano valencia laGrisellondon corey. So [facility] [phone].    ATENCIÓN: Si habla español, tiene a bailon disposición servicios gratuitos de asistencia lingüística. Llame al [phone].    We comply with applicable federal civil rights laws and [place] laws. We do not discriminate on the basis of race, color, national origin, age, disability, sex, sexual orientation, or gender identity.            Thank you!     Thank you for choosing [facility]  for your care. Our goal is always to provide you with excellent care. Hearing back from our patients is one way we can continue to improve our services. Please take a few minutes to complete the written survey that you may receive in the mail after your visit with us. Thank you!             Your Updated Medication List - Protect others around you: Learn how to safely use, store and throw away your medicines at www.disposemymeds.org.          This list is accurate as of 11/13/18 10:48 AM.  Always use your most recent med list.                   Brand Name Dispense Instructions for use Diagnosis    ibuprofen 200 MG tablet    ADVIL/MOTRIN     Take 200 mg by mouth every 4 hours as needed.        natalizumab 300 MG/15ML injection    TYSABRI    15 mL    Inject 15 mLs into the vein every 30 days.    Multiple sclerosis (H)       vitamin D 1000 units capsule      Take 1 capsule by mouth daily.

## 2018-11-13 NOTE — PROGRESS NOTES
Infusion Nursing Note:  Freddie Garcia presents today for Tysabri.    Patient seen by provider today: No   present during visit today: Not Applicable.    Note: N/A.    Intravenous Access:  Peripheral IV placed.    Treatment Conditions:  Tysabri pre-infusion checklist completed via touch program.    Post Infusion Assessment:  Patient tolerated infusion without incident.  Blood return noted pre and post infusion.  Site patent and intact, free from redness, edema or discomfort.  Access discontinued per protocol.    Discharge Plan:   Patient will return in one month for next appointment.   Patient discharged in stable condition accompanied by: self.  Departure Mode: Ambulatory.    Trisha Jones RN

## 2018-12-21 ENCOUNTER — INFUSION THERAPY VISIT (OUTPATIENT)
Dept: INFUSION THERAPY | Facility: CLINIC | Age: 38
End: 2018-12-21
Payer: COMMERCIAL

## 2018-12-21 VITALS
TEMPERATURE: 97.9 F | BODY MASS INDEX: 28.36 KG/M2 | OXYGEN SATURATION: 98 % | WEIGHT: 209.1 LBS | DIASTOLIC BLOOD PRESSURE: 93 MMHG | RESPIRATION RATE: 16 BRPM | HEART RATE: 74 BPM | SYSTOLIC BLOOD PRESSURE: 130 MMHG

## 2018-12-21 DIAGNOSIS — G35 MULTIPLE SCLEROSIS (H): Primary | ICD-10-CM

## 2018-12-21 PROCEDURE — 99207 ZZC NO CHARGE LOS: CPT

## 2018-12-21 PROCEDURE — 96413 CHEMO IV INFUSION 1 HR: CPT | Performed by: NURSE PRACTITIONER

## 2018-12-21 RX ADMIN — Medication 250 ML: at 08:23

## 2018-12-21 ASSESSMENT — PAIN SCALES - GENERAL: PAINLEVEL: NO PAIN (0)

## 2018-12-21 NOTE — PROGRESS NOTES
Infusion Nursing Note:  Freddie Garcia presents today for Tysabri.    Patient seen by provider today: No   present during visit today: Not Applicable.    Note: N/A.    Intravenous Access:  Peripheral IV placed.    Treatment Conditions:  Not Applicable.    Post Infusion Assessment:  Patient tolerated infusion without incident.  Patient declined post-infusion observation.  Blood return noted pre and post infusion.  Site patent and intact, free from redness, edema or discomfort.  No evidence of extravasations.  Access discontinued per protocol.    Discharge Plan:   Patient will return in 4 weeks for next appointment.   Patient discharged in stable condition accompanied by: self.  Departure Mode: Ambulatory.    Kellie Gonsalez, RN-BSN, PHN, OCN  [facility]

## 2019-02-01 ENCOUNTER — INFUSION THERAPY VISIT (OUTPATIENT)
Dept: INFUSION THERAPY | Facility: CLINIC | Age: 39
End: 2019-02-01
Payer: COMMERCIAL

## 2019-02-01 VITALS
HEART RATE: 75 BPM | SYSTOLIC BLOOD PRESSURE: 136 MMHG | WEIGHT: 203.8 LBS | OXYGEN SATURATION: 97 % | TEMPERATURE: 97.6 F | DIASTOLIC BLOOD PRESSURE: 81 MMHG | BODY MASS INDEX: 27.64 KG/M2

## 2019-02-01 DIAGNOSIS — G35 MULTIPLE SCLEROSIS (H): Primary | ICD-10-CM

## 2019-02-01 PROCEDURE — 99207 ZZC NO CHARGE LOS: CPT

## 2019-02-01 PROCEDURE — 96413 CHEMO IV INFUSION 1 HR: CPT | Performed by: INTERNAL MEDICINE

## 2019-02-01 RX ADMIN — Medication 250 ML: at 08:19

## 2019-02-01 ASSESSMENT — PAIN SCALES - GENERAL: PAINLEVEL: NO PAIN (0)

## 2019-02-01 NOTE — PROGRESS NOTES
Infusion Nursing Note:  Freddie Delgadoage presents today for Tysabri.    Patient seen by provider today: No   present during visit today: Not Applicable.    Note: N/A.    Intravenous Access:  Peripheral IV placed.    Treatment Conditions:  Tysabri pre-infusion checklist completed via touch program.    Post Infusion Assessment:  Patient tolerated infusion without incident.  Patient declined to stay for 60 minute observation post Tysabri.  Blood return noted pre and post infusion.  Site patent and intact, free from redness, edema or discomfort.  Access discontinued per protocol.    Discharge Plan:   Copy of AVS reviewed with patient and/or family.  Patient will return 3/15/19 for next appointment.  Patient discharged in stable condition accompanied by: self.  Departure Mode: Ambulatory.    Trisha Jones RN

## 2019-03-14 ENCOUNTER — MEDICAL CORRESPONDENCE (OUTPATIENT)
Dept: HEALTH INFORMATION MANAGEMENT | Facility: CLINIC | Age: 39
End: 2019-03-14

## 2019-03-15 ENCOUNTER — INFUSION THERAPY VISIT (OUTPATIENT)
Dept: INFUSION THERAPY | Facility: CLINIC | Age: 39
End: 2019-03-15
Payer: COMMERCIAL

## 2019-03-15 VITALS
BODY MASS INDEX: 27.27 KG/M2 | WEIGHT: 201.1 LBS | SYSTOLIC BLOOD PRESSURE: 115 MMHG | RESPIRATION RATE: 16 BRPM | OXYGEN SATURATION: 97 % | DIASTOLIC BLOOD PRESSURE: 78 MMHG | TEMPERATURE: 97.5 F | HEART RATE: 67 BPM

## 2019-03-15 DIAGNOSIS — G35 MULTIPLE SCLEROSIS (H): Primary | ICD-10-CM

## 2019-03-15 PROCEDURE — 99207 ZZC NO CHARGE LOS: CPT

## 2019-03-15 PROCEDURE — 96413 CHEMO IV INFUSION 1 HR: CPT | Performed by: INTERNAL MEDICINE

## 2019-03-15 RX ADMIN — Medication 250 ML: at 08:25

## 2019-03-15 ASSESSMENT — PAIN SCALES - GENERAL: PAINLEVEL: NO PAIN (0)

## 2019-03-15 NOTE — PROGRESS NOTES
Infusion Nursing Note:  Freddie Garcia presents today for Tysabri .    Patient seen by provider today: No   present during visit today: Not Applicable.    Note: patient feeling well, no symptoms currently.    Intravenous Access:  Peripheral IV placed.    Treatment Conditions:  Tysabri pre-infusion checklist completed via touch program.    Post Infusion Assessment:  Patient tolerated infusion without incident.     Discharge Plan:   RTC in 6 wks as scheduled.    PERCY GARCIA RN    Declined one hour post-infusion observation. Reminded pt to schedule next appointment. Tatianna Aiken RN

## 2019-04-26 ENCOUNTER — INFUSION THERAPY VISIT (OUTPATIENT)
Dept: INFUSION THERAPY | Facility: CLINIC | Age: 39
End: 2019-04-26
Payer: COMMERCIAL

## 2019-04-26 VITALS
WEIGHT: 202.9 LBS | RESPIRATION RATE: 18 BRPM | DIASTOLIC BLOOD PRESSURE: 70 MMHG | TEMPERATURE: 97.8 F | HEART RATE: 66 BPM | SYSTOLIC BLOOD PRESSURE: 116 MMHG | OXYGEN SATURATION: 99 % | BODY MASS INDEX: 27.52 KG/M2

## 2019-04-26 DIAGNOSIS — G35 MULTIPLE SCLEROSIS (H): Primary | ICD-10-CM

## 2019-04-26 PROCEDURE — 96413 CHEMO IV INFUSION 1 HR: CPT | Performed by: PHYSICIAN ASSISTANT

## 2019-04-26 PROCEDURE — 99207 ZZC NO CHARGE LOS: CPT

## 2019-04-26 RX ADMIN — Medication 250 ML: at 08:59

## 2019-04-26 ASSESSMENT — PAIN SCALES - GENERAL: PAINLEVEL: NO PAIN (0)

## 2019-04-26 NOTE — PROGRESS NOTES
Infusion Nursing Note:  Freddie Garcia presents today for Tysabri .    Patient seen by provider today: No   present during visit today: Not Applicable.    Note: patient has no symptoms.    Intravenous Access:  Peripheral IV placed.    Treatment Conditions:  Tysabri pre-infusion checklist completed via touch program.    Post Infusion Assessment:  Patient tolerated infusion without incident.     Discharge Plan:   RTC in 4 wks, patient to stop and schedule.    PERCY GARCIA RN

## 2019-06-07 ENCOUNTER — INFUSION THERAPY VISIT (OUTPATIENT)
Dept: INFUSION THERAPY | Facility: CLINIC | Age: 39
End: 2019-06-07
Payer: COMMERCIAL

## 2019-06-07 VITALS
HEART RATE: 67 BPM | RESPIRATION RATE: 16 BRPM | TEMPERATURE: 98.3 F | WEIGHT: 199.4 LBS | SYSTOLIC BLOOD PRESSURE: 122 MMHG | OXYGEN SATURATION: 96 % | BODY MASS INDEX: 27.04 KG/M2 | DIASTOLIC BLOOD PRESSURE: 84 MMHG

## 2019-06-07 DIAGNOSIS — G35 MULTIPLE SCLEROSIS (H): Primary | ICD-10-CM

## 2019-06-07 PROCEDURE — 99207 ZZC NO CHARGE LOS: CPT

## 2019-06-07 PROCEDURE — 96413 CHEMO IV INFUSION 1 HR: CPT | Performed by: INTERNAL MEDICINE

## 2019-06-07 RX ADMIN — Medication 250 ML: at 08:49

## 2019-06-07 ASSESSMENT — PAIN SCALES - GENERAL: PAINLEVEL: NO PAIN (0)

## 2019-06-07 NOTE — PROGRESS NOTES
Infusion Nursing Note:  Freddie Garcia presents today for Tysabri.   Patient seen by provider today: No   present during visit today: Not Applicable.    Note: N/A.    Intravenous Access:  Peripheral IV placed.    Treatment Conditions:  Tysabri pre-infusion checklist completed via touch program.      Post Infusion Assessment:  Patient declined 60 minute observation post infusion.   Patient tolerated infusion without incident.  Site patent and intact, free from redness, edema or discomfort.  No evidence of extravasations.  Access discontinued per protocol.       Discharge Plan:   Patient directed to scheduling.   Patient discharged in stable condition accompanied by: self.  Departure Mode: Ambulatory.    Trisha Keating RN

## 2019-07-19 ENCOUNTER — INFUSION THERAPY VISIT (OUTPATIENT)
Dept: INFUSION THERAPY | Facility: CLINIC | Age: 39
End: 2019-07-19
Payer: COMMERCIAL

## 2019-07-19 VITALS
TEMPERATURE: 97.8 F | BODY MASS INDEX: 26.75 KG/M2 | DIASTOLIC BLOOD PRESSURE: 80 MMHG | RESPIRATION RATE: 16 BRPM | WEIGHT: 197.2 LBS | OXYGEN SATURATION: 96 % | HEART RATE: 70 BPM | SYSTOLIC BLOOD PRESSURE: 129 MMHG

## 2019-07-19 DIAGNOSIS — G35 MULTIPLE SCLEROSIS (H): Primary | ICD-10-CM

## 2019-07-19 PROCEDURE — 96413 CHEMO IV INFUSION 1 HR: CPT | Performed by: INTERNAL MEDICINE

## 2019-07-19 PROCEDURE — 99207 ZZC NO CHARGE LOS: CPT

## 2019-07-19 RX ADMIN — Medication 250 ML: at 08:29

## 2019-07-19 ASSESSMENT — PAIN SCALES - GENERAL: PAINLEVEL: NO PAIN (0)

## 2019-07-19 NOTE — PROGRESS NOTES
Infusion Nursing Note:  Freddie Garcia presents today for Tysabri.    Patient seen by provider today: No   present during visit today: Not Applicable.    Note: patient has no symptoms. Good disease control on Tysabri.    Intravenous Access:  Peripheral IV placed.    Treatment Conditions:  Tysabri pre-infusion checklist completed via touch program.      Post Infusion Assessment:  Patient tolerated infusion without incident.       Discharge Plan:   RTC in 6 wks.  Patient to schedule.    PERCY GARCIA RN

## 2019-08-30 ENCOUNTER — INFUSION THERAPY VISIT (OUTPATIENT)
Dept: INFUSION THERAPY | Facility: CLINIC | Age: 39
End: 2019-08-30
Payer: COMMERCIAL

## 2019-08-30 VITALS
BODY MASS INDEX: 27.11 KG/M2 | RESPIRATION RATE: 16 BRPM | SYSTOLIC BLOOD PRESSURE: 146 MMHG | HEART RATE: 77 BPM | WEIGHT: 199.9 LBS | TEMPERATURE: 97.9 F | DIASTOLIC BLOOD PRESSURE: 95 MMHG | OXYGEN SATURATION: 97 %

## 2019-08-30 DIAGNOSIS — G35 MULTIPLE SCLEROSIS (H): Primary | ICD-10-CM

## 2019-08-30 PROCEDURE — 96413 CHEMO IV INFUSION 1 HR: CPT | Performed by: PHYSICIAN ASSISTANT

## 2019-08-30 PROCEDURE — 99207 ZZC NO CHARGE LOS: CPT

## 2019-08-30 RX ADMIN — Medication 250 ML: at 08:38

## 2019-08-30 ASSESSMENT — PAIN SCALES - GENERAL: PAINLEVEL: NO PAIN (0)

## 2019-08-30 NOTE — PROGRESS NOTES
Infusion Nursing Note:  Freddie Garcia presents today for Tysabri.    Patient seen by provider today: No   present during visit today: Not Applicable.    Note: N/A.    Intravenous Access:  Peripheral IV placed.    Treatment Conditions:  Tysabri pre-infusion checklist completed via touch program.      Post Infusion Assessment:  Patient tolerated infusion without incident.  Site patent and intact, free from redness, edema or discomfort.  No evidence of extravasations.  Access discontinued per protocol.  Patient declined staying for 60 minute observation post Tysabri.       Discharge Plan:   Patient to scheduling to schedule next appointment.  Patient discharged in stable condition accompanied by: self.  Departure Mode: Ambulatory.    Trisha Keating, RN, BSN

## 2019-10-11 ENCOUNTER — INFUSION THERAPY VISIT (OUTPATIENT)
Dept: INFUSION THERAPY | Facility: CLINIC | Age: 39
End: 2019-10-11
Payer: COMMERCIAL

## 2019-10-11 VITALS
BODY MASS INDEX: 27.15 KG/M2 | TEMPERATURE: 97.4 F | DIASTOLIC BLOOD PRESSURE: 79 MMHG | SYSTOLIC BLOOD PRESSURE: 125 MMHG | OXYGEN SATURATION: 99 % | WEIGHT: 200.2 LBS | HEART RATE: 98 BPM | RESPIRATION RATE: 16 BRPM

## 2019-10-11 DIAGNOSIS — G35 MULTIPLE SCLEROSIS (H): Primary | ICD-10-CM

## 2019-10-11 PROCEDURE — 99207 ZZC NO CHARGE LOS: CPT

## 2019-10-11 PROCEDURE — 96413 CHEMO IV INFUSION 1 HR: CPT | Performed by: PHYSICIAN ASSISTANT

## 2019-10-11 RX ADMIN — Medication 250 ML: at 08:17

## 2019-10-11 ASSESSMENT — PAIN SCALES - GENERAL: PAINLEVEL: NO PAIN (0)

## 2019-10-11 NOTE — PROGRESS NOTES
Infusion Nursing Note:  Freddie Garcia presents today for Tysabri.   Patient seen by provider today: No   present during visit today: Not Applicable.    Note: N/A.    Intravenous Access:  Peripheral IV placed.    Treatment Conditions:  Tysabri pre-infusion checklist completed via touch program.      Post Infusion Assessment:  Patient tolerated infusion without incident.  Site patent and intact, free from redness, edema or discomfort.  No evidence of extravasations.  Access discontinued per protocol.  Patient declined observation post tysabri.       Discharge Plan:   Patient will return 11/22 for next appointment.   Patient discharged in stable condition accompanied by: self.  Departure Mode: Ambulatory.    Trisha Keating, RN, BSN

## 2019-11-22 ENCOUNTER — INFUSION THERAPY VISIT (OUTPATIENT)
Dept: INFUSION THERAPY | Facility: CLINIC | Age: 39
End: 2019-11-22
Payer: COMMERCIAL

## 2019-11-22 VITALS
SYSTOLIC BLOOD PRESSURE: 129 MMHG | WEIGHT: 205.3 LBS | TEMPERATURE: 97.4 F | RESPIRATION RATE: 16 BRPM | OXYGEN SATURATION: 98 % | DIASTOLIC BLOOD PRESSURE: 90 MMHG | HEART RATE: 60 BPM | BODY MASS INDEX: 27.84 KG/M2

## 2019-11-22 DIAGNOSIS — G35 MULTIPLE SCLEROSIS (H): Primary | ICD-10-CM

## 2019-11-22 PROCEDURE — 99207 ZZC NO CHARGE LOS: CPT

## 2019-11-22 PROCEDURE — 96413 CHEMO IV INFUSION 1 HR: CPT | Performed by: NURSE PRACTITIONER

## 2019-11-22 RX ADMIN — Medication 250 ML: at 08:14

## 2019-11-22 ASSESSMENT — PAIN SCALES - GENERAL: PAINLEVEL: NO PAIN (0)

## 2019-11-22 NOTE — PROGRESS NOTES
Infusion Nursing Note:  Freddie Garcia presents today for Tysabri.    Patient seen by provider today: No   present during visit today: Not Applicable.    Note: N/A.    Intravenous Access:  Peripheral IV placed.    Treatment Conditions:  Tysabri pre-infusion checklist completed via touch program.      Post Infusion Assessment:  Patient tolerated infusion without incident.  Site patent and intact, free from redness, edema or discomfort.  No evidence of extravasations.  Access discontinued per protocol.  Patient refused staying for 60 min observation.       Discharge Plan:   Patient directed to scheduling to make next appt.  Patient discharged in stable condition accompanied by: self.  Departure Mode: Ambulatory.    Trisha Keating RN

## 2020-01-03 ENCOUNTER — INFUSION THERAPY VISIT (OUTPATIENT)
Dept: INFUSION THERAPY | Facility: CLINIC | Age: 40
End: 2020-01-03
Payer: COMMERCIAL

## 2020-01-03 VITALS
TEMPERATURE: 97.6 F | BODY MASS INDEX: 27.19 KG/M2 | OXYGEN SATURATION: 98 % | DIASTOLIC BLOOD PRESSURE: 86 MMHG | WEIGHT: 200.5 LBS | HEART RATE: 77 BPM | SYSTOLIC BLOOD PRESSURE: 130 MMHG | RESPIRATION RATE: 16 BRPM

## 2020-01-03 DIAGNOSIS — G35 MULTIPLE SCLEROSIS (H): Primary | ICD-10-CM

## 2020-01-03 PROCEDURE — 99207 ZZC NO CHARGE LOS: CPT

## 2020-01-03 PROCEDURE — 96413 CHEMO IV INFUSION 1 HR: CPT | Performed by: NURSE PRACTITIONER

## 2020-01-03 RX ADMIN — Medication 250 ML: at 08:21

## 2020-01-03 ASSESSMENT — PAIN SCALES - GENERAL: PAINLEVEL: NO PAIN (0)

## 2020-01-03 NOTE — PROGRESS NOTES
Infusion Nursing Note:  Freddie Garcia presents today for Tysabri.    Patient seen by provider today: No   present during visit today: Not Applicable.    Note: N/A.    Intravenous Access:  Peripheral IV placed.    Treatment Conditions:  Tysabri pre-infusion checklist completed via touch program.      Post Infusion Assessment:  Patient tolerated infusion without incident.  Patient declined observation after Tysabri.  Blood return noted pre and post infusion.  Site patent and intact, free from redness, edema or discomfort.  No evidence of extravasations.  Access discontinued per protocol.       Discharge Plan:   Patient will return 2/14/20 for next appointment.   Patient discharged in stable condition accompanied by: self.  Departure Mode: Ambulatory.    Sherrie Figueroa RN

## 2020-02-14 ENCOUNTER — INFUSION THERAPY VISIT (OUTPATIENT)
Dept: INFUSION THERAPY | Facility: CLINIC | Age: 40
End: 2020-02-14
Payer: COMMERCIAL

## 2020-02-14 VITALS
HEART RATE: 68 BPM | BODY MASS INDEX: 26.92 KG/M2 | DIASTOLIC BLOOD PRESSURE: 91 MMHG | SYSTOLIC BLOOD PRESSURE: 155 MMHG | OXYGEN SATURATION: 98 % | WEIGHT: 198.5 LBS | TEMPERATURE: 97.8 F

## 2020-02-14 DIAGNOSIS — G35 MULTIPLE SCLEROSIS (H): Primary | ICD-10-CM

## 2020-02-14 PROCEDURE — 99207 ZZC NO CHARGE NURSE ONLY: CPT

## 2020-02-14 PROCEDURE — 96413 CHEMO IV INFUSION 1 HR: CPT | Performed by: NURSE PRACTITIONER

## 2020-02-14 RX ADMIN — Medication 250 ML: at 08:35

## 2020-02-14 ASSESSMENT — PAIN SCALES - GENERAL: PAINLEVEL: NO PAIN (0)

## 2020-02-14 NOTE — PROGRESS NOTES
Infusion Nursing Note:  Freddie Garcia presents today for Tysabri.    Patient seen by provider today: No   present during visit today: Not Applicable.    Note: Pt denies new medical concerns, see flow sheet for assessment.    Intravenous Access:  Peripheral IV placed.    Treatment Conditions:  Tysabri pre-infusion checklist completed via touch program.      Post Infusion Assessment:  Patient tolerated infusion without incident.  Blood return noted pre and post infusion.  Site patent and intact, free from redness, edema or discomfort.  No evidence of extravasations.  Access discontinued per protocol.       Discharge Plan:   Patient discharged in stable condition accompanied by: self.  Departure Mode: Ambulatory.  Pt will RTC 3/27/20 for Tysabri.    Cuco Duke RN

## 2020-03-26 RX ORDER — HEPARIN SODIUM (PORCINE) LOCK FLUSH IV SOLN 100 UNIT/ML 100 UNIT/ML
5 SOLUTION INTRAVENOUS
Status: CANCELLED | OUTPATIENT
Start: 2020-03-27

## 2020-03-26 RX ORDER — HEPARIN SODIUM,PORCINE 10 UNIT/ML
5 VIAL (ML) INTRAVENOUS
Status: CANCELLED | OUTPATIENT
Start: 2020-03-27

## 2020-03-27 ENCOUNTER — INFUSION THERAPY VISIT (OUTPATIENT)
Dept: INFUSION THERAPY | Facility: CLINIC | Age: 40
End: 2020-03-27
Payer: COMMERCIAL

## 2020-03-27 VITALS
DIASTOLIC BLOOD PRESSURE: 83 MMHG | BODY MASS INDEX: 26.46 KG/M2 | WEIGHT: 195.1 LBS | RESPIRATION RATE: 18 BRPM | TEMPERATURE: 97.8 F | HEART RATE: 84 BPM | OXYGEN SATURATION: 98 % | SYSTOLIC BLOOD PRESSURE: 129 MMHG

## 2020-03-27 DIAGNOSIS — G35 MULTIPLE SCLEROSIS (H): Primary | ICD-10-CM

## 2020-03-27 PROCEDURE — 96413 CHEMO IV INFUSION 1 HR: CPT | Performed by: PHYSICIAN ASSISTANT

## 2020-03-27 PROCEDURE — 99207 ZZC NO CHARGE NURSE ONLY: CPT

## 2020-03-27 RX ORDER — HEPARIN SODIUM,PORCINE 10 UNIT/ML
5 VIAL (ML) INTRAVENOUS
Status: CANCELLED | OUTPATIENT
Start: 2020-04-24

## 2020-03-27 RX ORDER — HEPARIN SODIUM (PORCINE) LOCK FLUSH IV SOLN 100 UNIT/ML 100 UNIT/ML
5 SOLUTION INTRAVENOUS
Status: CANCELLED | OUTPATIENT
Start: 2020-04-24

## 2020-03-27 RX ADMIN — Medication 250 ML: at 08:33

## 2020-03-27 ASSESSMENT — PAIN SCALES - GENERAL: PAINLEVEL: NO PAIN (0)

## 2020-03-27 NOTE — PROGRESS NOTES
Infusion Nursing Note:  Freddie Garcia presents today for Tysabri infusion.    Patient seen by provider today: No   present during visit today: Not Applicable.    Note: Patient reported he has received this infusion for 8 years and tolerated well in the past.    Intravenous Access:  Peripheral IV placed.    Treatment Conditions:  Tysabri pre-infusion checklist completed via touch program.      Post Infusion Assessment:  Patient tolerated infusion without incident.  Blood return noted pre and post infusion.  Site patent and intact, free from redness, edema or discomfort.  No evidence of extravasations.       Discharge Plan:   Patient discharged in stable condition accompanied by: self.  Scheduled on May 8th, 2020 for his next infusion.     Anna Palafox RN

## 2020-05-08 ENCOUNTER — INFUSION THERAPY VISIT (OUTPATIENT)
Dept: INFUSION THERAPY | Facility: CLINIC | Age: 40
End: 2020-05-08
Payer: COMMERCIAL

## 2020-05-08 VITALS
DIASTOLIC BLOOD PRESSURE: 82 MMHG | RESPIRATION RATE: 18 BRPM | TEMPERATURE: 97.6 F | SYSTOLIC BLOOD PRESSURE: 126 MMHG | HEART RATE: 88 BPM | BODY MASS INDEX: 24.72 KG/M2 | OXYGEN SATURATION: 100 % | WEIGHT: 182.3 LBS

## 2020-05-08 DIAGNOSIS — G35 MULTIPLE SCLEROSIS (H): Primary | ICD-10-CM

## 2020-05-08 PROCEDURE — 99207 ZZC NO CHARGE LOS: CPT

## 2020-05-08 PROCEDURE — 96413 CHEMO IV INFUSION 1 HR: CPT | Performed by: PHYSICIAN ASSISTANT

## 2020-05-08 RX ORDER — HEPARIN SODIUM (PORCINE) LOCK FLUSH IV SOLN 100 UNIT/ML 100 UNIT/ML
5 SOLUTION INTRAVENOUS
Status: CANCELLED | OUTPATIENT
Start: 2020-05-22

## 2020-05-08 RX ORDER — HEPARIN SODIUM,PORCINE 10 UNIT/ML
5 VIAL (ML) INTRAVENOUS
Status: CANCELLED | OUTPATIENT
Start: 2020-05-22

## 2020-05-08 RX ADMIN — Medication 250 ML: at 11:20

## 2020-05-08 ASSESSMENT — PAIN SCALES - GENERAL: PAINLEVEL: NO PAIN (0)

## 2020-05-08 NOTE — PROGRESS NOTES
Infusion Nursing Note:  Freddie Garcia presents today for Tysabri.    Patient seen by provider today: No   present during visit today: Not Applicable.    Note: no symptoms per patient.    Intravenous Access:  Peripheral IV placed.    Treatment Conditions:  Tysabri pre-infusion checklist completed via touch program.    Post Infusion Assessment:  Patient tolerated infusion without incident.       Discharge Plan:   Copy of AVS reviewed with patient and/or family.  Patient will return 6/19/20 for next appointment.  Patient discharged in stable condition accompanied by: self.  Departure Mode: Ambulatory.    PERCY GARCIA RN

## 2020-06-19 ENCOUNTER — INFUSION THERAPY VISIT (OUTPATIENT)
Dept: INFUSION THERAPY | Facility: CLINIC | Age: 40
End: 2020-06-19
Payer: COMMERCIAL

## 2020-06-19 VITALS
OXYGEN SATURATION: 98 % | BODY MASS INDEX: 26.47 KG/M2 | WEIGHT: 195.2 LBS | TEMPERATURE: 97 F | SYSTOLIC BLOOD PRESSURE: 134 MMHG | RESPIRATION RATE: 16 BRPM | DIASTOLIC BLOOD PRESSURE: 80 MMHG | HEART RATE: 79 BPM

## 2020-06-19 DIAGNOSIS — G35 MULTIPLE SCLEROSIS (H): Primary | ICD-10-CM

## 2020-06-19 PROCEDURE — 99207 ZZC NO CHARGE LOS: CPT

## 2020-06-19 PROCEDURE — 96413 CHEMO IV INFUSION 1 HR: CPT | Performed by: INTERNAL MEDICINE

## 2020-06-19 RX ORDER — HEPARIN SODIUM,PORCINE 10 UNIT/ML
5 VIAL (ML) INTRAVENOUS
Status: CANCELLED | OUTPATIENT
Start: 2020-07-03

## 2020-06-19 RX ORDER — HEPARIN SODIUM (PORCINE) LOCK FLUSH IV SOLN 100 UNIT/ML 100 UNIT/ML
5 SOLUTION INTRAVENOUS
Status: CANCELLED | OUTPATIENT
Start: 2020-07-03

## 2020-06-19 RX ADMIN — Medication 250 ML: at 08:43

## 2020-06-19 ASSESSMENT — PAIN SCALES - GENERAL: PAINLEVEL: NO PAIN (0)

## 2020-06-19 NOTE — PROGRESS NOTES
Infusion Nursing Note:  Freddie Garcia presents today for Tysabri.    Patient seen by provider today: No   present during visit today: Not Applicable.    Note: Pt reports no changes with health from last infusion.    Intravenous Access:  Peripheral IV placed.    Treatment Conditions:  Tysabri pre-infusion checklist completed via touch program.      Post Infusion Assessment:  Patient tolerated infusion without incident.  Patient refused 60 minute observation post tysabri.  Site patent and intact, free from redness, edema or discomfort.  No evidence of extravasations.  Access discontinued per protocol.       Discharge Plan:   Patient directed to scheduling.  Patient discharged in stable condition accompanied by: self.  Departure Mode: Ambulatory.    Trisha Keating RN

## 2020-07-31 ENCOUNTER — INFUSION THERAPY VISIT (OUTPATIENT)
Dept: INFUSION THERAPY | Facility: CLINIC | Age: 40
End: 2020-07-31
Payer: COMMERCIAL

## 2020-07-31 VITALS
TEMPERATURE: 97.7 F | HEART RATE: 75 BPM | SYSTOLIC BLOOD PRESSURE: 127 MMHG | DIASTOLIC BLOOD PRESSURE: 71 MMHG | OXYGEN SATURATION: 97 % | RESPIRATION RATE: 16 BRPM | BODY MASS INDEX: 26.62 KG/M2 | WEIGHT: 196.3 LBS

## 2020-07-31 DIAGNOSIS — G35 MULTIPLE SCLEROSIS (H): Primary | ICD-10-CM

## 2020-07-31 PROCEDURE — 96413 CHEMO IV INFUSION 1 HR: CPT | Performed by: INTERNAL MEDICINE

## 2020-07-31 PROCEDURE — 99207 ZZC NO CHARGE LOS: CPT

## 2020-07-31 RX ORDER — HEPARIN SODIUM (PORCINE) LOCK FLUSH IV SOLN 100 UNIT/ML 100 UNIT/ML
5 SOLUTION INTRAVENOUS
Status: CANCELLED | OUTPATIENT
Start: 2020-08-14

## 2020-07-31 RX ORDER — HEPARIN SODIUM,PORCINE 10 UNIT/ML
5 VIAL (ML) INTRAVENOUS
Status: CANCELLED | OUTPATIENT
Start: 2020-08-14

## 2020-07-31 RX ADMIN — Medication 250 ML: at 08:16

## 2020-07-31 ASSESSMENT — PAIN SCALES - GENERAL: PAINLEVEL: NO PAIN (0)

## 2020-07-31 NOTE — PROGRESS NOTES
Infusion Nursing Note:  Freddie Garcia presents today for Tysabri infusion.    Patient seen by provider today: No   present during visit today: Not Applicable.    Note: N/A.    Intravenous Access:  Peripheral IV placed.    Treatment Conditions:  Tysabri pre-infusion checklist completed via touch program.    Post Infusion Assessment:  Patient tolerated infusion without incident.  Patient declined post infusion observation.  Blood return noted pre and post infusion.  Site patent and intact, free from redness, edema or discomfort.  No evidence of extravasations.  Access discontinued per protocol.       Discharge Plan:   Patient will schedule next appointment before leaving today.  Patient discharged in stable condition accompanied by: self.  Departure Mode: Ambulatory.    Sherrie Figueroa RN

## 2020-09-11 ENCOUNTER — INFUSION THERAPY VISIT (OUTPATIENT)
Dept: INFUSION THERAPY | Facility: CLINIC | Age: 40
End: 2020-09-11
Payer: COMMERCIAL

## 2020-09-11 VITALS
WEIGHT: 196.3 LBS | DIASTOLIC BLOOD PRESSURE: 88 MMHG | HEART RATE: 82 BPM | BODY MASS INDEX: 26.62 KG/M2 | OXYGEN SATURATION: 97 % | RESPIRATION RATE: 18 BRPM | TEMPERATURE: 98.1 F | SYSTOLIC BLOOD PRESSURE: 126 MMHG

## 2020-09-11 DIAGNOSIS — G35 MULTIPLE SCLEROSIS (H): Primary | ICD-10-CM

## 2020-09-11 PROCEDURE — 96413 CHEMO IV INFUSION 1 HR: CPT | Performed by: INTERNAL MEDICINE

## 2020-09-11 PROCEDURE — 99207 ZZC NO CHARGE NURSE ONLY: CPT

## 2020-09-11 RX ORDER — HEPARIN SODIUM,PORCINE 10 UNIT/ML
5 VIAL (ML) INTRAVENOUS
Status: CANCELLED | OUTPATIENT
Start: 2020-09-25

## 2020-09-11 RX ORDER — HEPARIN SODIUM (PORCINE) LOCK FLUSH IV SOLN 100 UNIT/ML 100 UNIT/ML
5 SOLUTION INTRAVENOUS
Status: CANCELLED | OUTPATIENT
Start: 2020-09-25

## 2020-09-11 RX ADMIN — Medication 250 ML: at 09:05

## 2020-09-11 ASSESSMENT — PAIN SCALES - GENERAL: PAINLEVEL: NO PAIN (0)

## 2020-09-11 NOTE — PROGRESS NOTES
Infusion Nursing Note:  Freddie Garcia presents today for Tysabri every 6 weeks.   Patient seen by provider today: No   present during visit today: Not Applicable.    Note: Pt states he is doing well, denies any problems today, denies any reason to hold therapy - will treat as ordered.    Intravenous Access:  Peripheral IV placed.    Treatment Conditions:  Tysabri pre-infusion checklist completed via touch program.      Post Infusion Assessment:  Pt declines one hour wait time.  Patient tolerated infusion without incident.  Blood return noted pre and post infusion.  Site patent and intact, free from redness, edema or discomfort.  No evidence of extravasations.  Access discontinued per protocol.       Discharge Plan:   Return in 6 weeks for Tysabri Infusion.  Discharge instructions reviewed with: Patient.  Patient and/or family verbalized understanding of discharge instructions and all questions answered.  Patient discharged in stable condition accompanied by: self.  Departure Mode: Ambulatory.    Suzan Gonzalez RN

## 2020-10-23 ENCOUNTER — INFUSION THERAPY VISIT (OUTPATIENT)
Dept: INFUSION THERAPY | Facility: CLINIC | Age: 40
End: 2020-10-23
Payer: COMMERCIAL

## 2020-10-23 VITALS
HEART RATE: 68 BPM | TEMPERATURE: 97.8 F | WEIGHT: 196.5 LBS | BODY MASS INDEX: 26.65 KG/M2 | SYSTOLIC BLOOD PRESSURE: 128 MMHG | DIASTOLIC BLOOD PRESSURE: 83 MMHG | RESPIRATION RATE: 14 BRPM | OXYGEN SATURATION: 98 %

## 2020-10-23 DIAGNOSIS — G35 MULTIPLE SCLEROSIS (H): Primary | ICD-10-CM

## 2020-10-23 PROCEDURE — 96413 CHEMO IV INFUSION 1 HR: CPT | Performed by: INTERNAL MEDICINE

## 2020-10-23 PROCEDURE — 99207 PR NO CHARGE LOS: CPT

## 2020-10-23 RX ORDER — HEPARIN SODIUM,PORCINE 10 UNIT/ML
5 VIAL (ML) INTRAVENOUS
Status: CANCELLED | OUTPATIENT
Start: 2020-11-06

## 2020-10-23 RX ORDER — HEPARIN SODIUM (PORCINE) LOCK FLUSH IV SOLN 100 UNIT/ML 100 UNIT/ML
5 SOLUTION INTRAVENOUS
Status: CANCELLED | OUTPATIENT
Start: 2020-11-06

## 2020-10-23 RX ADMIN — Medication 250 ML: at 08:45

## 2020-10-23 ASSESSMENT — PAIN SCALES - GENERAL: PAINLEVEL: NO PAIN (0)

## 2020-10-23 NOTE — PROGRESS NOTES
Infusion Nursing Note:  Freddie Garcia presents today for Tysabri infusion.    Patient seen by provider today: No   present during visit today: Not Applicable.    Note: N/A.    Intravenous Access:  Peripheral IV placed.    Treatment Conditions:  Tysabri pre-infusion checklist completed via touch program.      Post Infusion Assessment:  Patient tolerated infusion without incident.  Patient declined observation after Tysabri.  Blood return noted pre and post infusion.  Site patent and intact, free from redness, edema or discomfort.  No evidence of extravasations.  Access discontinued per protocol.       Discharge Plan:   AVS to patient via MYCHART.  Patient will return 12/4/2020 for next appointment.   Patient discharged in stable condition accompanied by: self.  Departure Mode: Ambulatory.    Sherrie Figueroa RN

## 2020-12-04 ENCOUNTER — INFUSION THERAPY VISIT (OUTPATIENT)
Dept: INFUSION THERAPY | Facility: CLINIC | Age: 40
End: 2020-12-04
Payer: COMMERCIAL

## 2020-12-04 VITALS
RESPIRATION RATE: 16 BRPM | OXYGEN SATURATION: 97 % | WEIGHT: 199.6 LBS | HEART RATE: 84 BPM | DIASTOLIC BLOOD PRESSURE: 77 MMHG | BODY MASS INDEX: 27.07 KG/M2 | SYSTOLIC BLOOD PRESSURE: 121 MMHG | TEMPERATURE: 97.7 F

## 2020-12-04 DIAGNOSIS — G35 MULTIPLE SCLEROSIS (H): Primary | ICD-10-CM

## 2020-12-04 PROCEDURE — 99207 PR NO CHARGE LOS: CPT

## 2020-12-04 PROCEDURE — 96413 CHEMO IV INFUSION 1 HR: CPT | Performed by: INTERNAL MEDICINE

## 2020-12-04 RX ORDER — HEPARIN SODIUM (PORCINE) LOCK FLUSH IV SOLN 100 UNIT/ML 100 UNIT/ML
5 SOLUTION INTRAVENOUS
Status: CANCELLED | OUTPATIENT
Start: 2020-12-18

## 2020-12-04 RX ORDER — HEPARIN SODIUM,PORCINE 10 UNIT/ML
5 VIAL (ML) INTRAVENOUS
Status: CANCELLED | OUTPATIENT
Start: 2020-12-18

## 2020-12-04 RX ADMIN — Medication 250 ML: at 08:47

## 2020-12-04 ASSESSMENT — PAIN SCALES - GENERAL: PAINLEVEL: NO PAIN (0)

## 2020-12-04 NOTE — PROGRESS NOTES
Infusion Nursing Note:  Freddie Garcia presents today for tysabri.    Patient seen by provider today: No   present during visit today: Not Applicable.    Intravenous Access:  Peripheral IV placed.    Treatment Conditions:  Tysabri pre-infusion checklist completed via touch program.      Post Infusion Assessment:  Patient tolerated infusion without incident.  Reinier denied need to stay for 60 minutes post tysabri.  Blood return noted pre and post infusion.  Site patent and intact, free from redness, edema or discomfort.  No evidence of extravasations.  Access discontinued per protocol.       Discharge Plan:   Patient discharged in stable condition accompanied by: self.  Departure Mode: Ambulatory.  Reviewed with Reinier he needs to stop and schedule next infusion on 1/15/21.    Lindsey Mills RN

## 2021-01-01 NOTE — MR AVS SNAPSHOT
After Visit Summary   9/8/2017    Freddie Garcia    MRN: [id number]           Patient Information     Date Of Birth          1980        Visit Information        Provider Department      9/8/2017 8:00 AM BAY 1 INFUSION [facility]        Today's Diagnoses     Multiple sclerosis (H)    -  1       Follow-ups after your visit        Your next 10 appointments already scheduled     Oct 20, 2017  8:00 AM CDT   Level 2 with BAY 10 INFUSION   [facility]    [address] [place] [id number] [phone]              Who to contact     If you have questions or need follow up information about today's clinic visit or your schedule please contact [facility] directly at [phone].  Normal or non-critical lab and imaging results will be communicated to you by MyChart, letter or phone within 4 business days after the clinic has received the results. If you do not hear from us within 7 days, please contact the clinic through MyChart or phone. If you have a critical or abnormal lab result, we will notify you by phone as soon as possible.  Submit refill requests through [employer] or call your pharmacy and they will forward the refill request to us. Please allow 3 business days for your refill to be completed.          Additional Information About Your Visit        [employer] Information     [employer] is an electronic gateway that provides easy, online access to your medical records. With [employer], you can request a clinic appointment, read your test results, renew a prescription or communicate with your care team.     To sign up for [employer] visit the website at [email or web address]   You will be asked to enter the access code listed below, as well as some personal information. Please follow the directions to create your username and password.     Your access code is: [email or web address]  Expires: 10/26/2017 10:45 AM     Your  access code will  in 90 days. If you need help or a new code, please contact your [facility] or call [phone] for assistance.        Care EveryWhere ID     This is your Care EveryWhere ID. This could be used by other organizations to access your [facility] medical records  [id number]        Your Vitals Were     Pulse Temperature Respirations Pulse Oximetry BMI (Body Mass Index)       82 97.3  F (36.3  C) (Oral) 16 96% 26.72 kg/m2        Blood Pressure from Last 3 Encounters:   17 128/82   17 117/79   17 123/84    Weight from Last 3 Encounters:   17 89.4 kg (197 lb)   17 89.9 kg (198 lb 1.6 oz)   17 90.9 kg (200 lb 4.8 oz)              Today, you had the following     No orders found for display       Primary Care Provider    [facility]       No address on file        Equal Access to Services     TANYA ALVES : Hadii yair kelleyo Soana, waaxda luqadaha, qaybta kaalmada adeegyada, silviano [place] . So [facility] [phone].    ATENCIÓN: Si habla español, tiene a bailon disposición servicios gratuitos de asistencia lingüística. Llame al [phone].    We comply with applicable federal civil rights laws and [place] laws. We do not discriminate on the basis of race, color, national origin, age, disability sex, sexual orientation or gender identity.            Thank you!     Thank you for choosing [facility]  for your care. Our goal is always to provide you with excellent care. Hearing back from our patients is one way we can continue to improve our services. Please take a few minutes to complete the written survey that you may receive in the mail after your visit with us. Thank you!             Your Updated Medication List - Protect others around you: Learn how to safely use, store and throw away your medicines at www.disposemymeds.org.          This list is accurate as of:  9/8/17  2:01 PM.  Always use your most recent med list.                   Brand Name Dispense Instructions for use Diagnosis    ibuprofen 200 MG tablet    ADVIL/MOTRIN     Take 200 mg by mouth every 4 hours as needed.        natalizumab 300 MG/15ML injection    TYSABRI    15 mL    Inject 15 mLs into the vein every 30 days.    Multiple sclerosis (H)       vitamin D 1000 UNITS capsule      Take 1 capsule by mouth daily.           Posture, length, shape, position symmetric and appropriate for age/Movement patterns with normal strength and range of motion